# Patient Record
Sex: FEMALE | URBAN - METROPOLITAN AREA
[De-identification: names, ages, dates, MRNs, and addresses within clinical notes are randomized per-mention and may not be internally consistent; named-entity substitution may affect disease eponyms.]

---

## 2019-02-14 ENCOUNTER — INPATIENT (INPATIENT)
Facility: HOSPITAL | Age: 79
LOS: 6 days | Discharge: HOME | DRG: 205 | End: 2019-02-21
Attending: INTERNAL MEDICINE | Admitting: INTERNAL MEDICINE
Payer: MEDICARE

## 2019-02-14 VITALS
TEMPERATURE: 100 F | RESPIRATION RATE: 14 BRPM | OXYGEN SATURATION: 95 % | WEIGHT: 160.06 LBS | HEART RATE: 103 BPM | SYSTOLIC BLOOD PRESSURE: 108 MMHG | DIASTOLIC BLOOD PRESSURE: 73 MMHG | HEIGHT: 67 IN

## 2019-02-14 DIAGNOSIS — Z29.9 ENCOUNTER FOR PROPHYLACTIC MEASURES, UNSPECIFIED: ICD-10-CM

## 2019-02-14 DIAGNOSIS — N12 TUBULO-INTERSTITIAL NEPHRITIS, NOT SPECIFIED AS ACUTE OR CHRONIC: ICD-10-CM

## 2019-02-14 DIAGNOSIS — I10 ESSENTIAL (PRIMARY) HYPERTENSION: ICD-10-CM

## 2019-02-14 LAB
ALBUMIN SERPL ELPH-MCNC: 3.2 G/DL — LOW (ref 3.3–5)
ALP SERPL-CCNC: 64 U/L — SIGNIFICANT CHANGE UP (ref 40–120)
ALT FLD-CCNC: 90 U/L — HIGH (ref 12–78)
ANION GAP SERPL CALC-SCNC: 8 MMOL/L — SIGNIFICANT CHANGE UP (ref 5–17)
APPEARANCE UR: CLEAR — SIGNIFICANT CHANGE UP
APTT BLD: 33.5 SEC — SIGNIFICANT CHANGE UP (ref 28.5–37)
AST SERPL-CCNC: 129 U/L — HIGH (ref 15–37)
BASOPHILS # BLD AUTO: 0.02 K/UL — SIGNIFICANT CHANGE UP (ref 0–0.2)
BASOPHILS NFR BLD AUTO: 0.1 % — SIGNIFICANT CHANGE UP (ref 0–2)
BILIRUB SERPL-MCNC: 0.7 MG/DL — SIGNIFICANT CHANGE UP (ref 0.2–1.2)
BILIRUB UR-MCNC: NEGATIVE — SIGNIFICANT CHANGE UP
BUN SERPL-MCNC: 9 MG/DL — SIGNIFICANT CHANGE UP (ref 7–23)
CALCIUM SERPL-MCNC: 8.6 MG/DL — SIGNIFICANT CHANGE UP (ref 8.5–10.1)
CHLORIDE SERPL-SCNC: 104 MMOL/L — SIGNIFICANT CHANGE UP (ref 96–108)
CO2 SERPL-SCNC: 29 MMOL/L — SIGNIFICANT CHANGE UP (ref 22–31)
COLOR SPEC: YELLOW — SIGNIFICANT CHANGE UP
CREAT SERPL-MCNC: 0.73 MG/DL — SIGNIFICANT CHANGE UP (ref 0.5–1.3)
DIFF PNL FLD: ABNORMAL
EOSINOPHIL # BLD AUTO: 0.01 K/UL — SIGNIFICANT CHANGE UP (ref 0–0.5)
EOSINOPHIL NFR BLD AUTO: 0.1 % — SIGNIFICANT CHANGE UP (ref 0–6)
GLUCOSE SERPL-MCNC: 157 MG/DL — HIGH (ref 70–99)
GLUCOSE UR QL: NEGATIVE — SIGNIFICANT CHANGE UP
HCT VFR BLD CALC: 37.9 % — SIGNIFICANT CHANGE UP (ref 34.5–45)
HGB BLD-MCNC: 13.2 G/DL — SIGNIFICANT CHANGE UP (ref 11.5–15.5)
IMM GRANULOCYTES NFR BLD AUTO: 0.5 % — SIGNIFICANT CHANGE UP (ref 0–1.5)
INR BLD: 1.23 RATIO — HIGH (ref 0.88–1.16)
KETONES UR-MCNC: ABNORMAL
LACTATE SERPL-SCNC: 1.4 MMOL/L — SIGNIFICANT CHANGE UP (ref 0.7–2)
LEUKOCYTE ESTERASE UR-ACNC: ABNORMAL
LIDOCAIN IGE QN: 48 U/L — LOW (ref 73–393)
LYMPHOCYTES # BLD AUTO: 1.03 K/UL — SIGNIFICANT CHANGE UP (ref 1–3.3)
LYMPHOCYTES # BLD AUTO: 7.5 % — LOW (ref 13–44)
MAGNESIUM SERPL-MCNC: 1.8 MG/DL — SIGNIFICANT CHANGE UP (ref 1.6–2.6)
MCHC RBC-ENTMCNC: 29.1 PG — SIGNIFICANT CHANGE UP (ref 27–34)
MCHC RBC-ENTMCNC: 34.8 GM/DL — SIGNIFICANT CHANGE UP (ref 32–36)
MCV RBC AUTO: 83.5 FL — SIGNIFICANT CHANGE UP (ref 80–100)
MONOCYTES # BLD AUTO: 1.05 K/UL — HIGH (ref 0–0.9)
MONOCYTES NFR BLD AUTO: 7.6 % — SIGNIFICANT CHANGE UP (ref 2–14)
NEUTROPHILS # BLD AUTO: 11.6 K/UL — HIGH (ref 1.8–7.4)
NEUTROPHILS NFR BLD AUTO: 84.2 % — HIGH (ref 43–77)
NITRITE UR-MCNC: NEGATIVE — SIGNIFICANT CHANGE UP
NRBC # BLD: 0 /100 WBCS — SIGNIFICANT CHANGE UP (ref 0–0)
PH UR: 7 — SIGNIFICANT CHANGE UP (ref 5–8)
PHOSPHATE SERPL-MCNC: 3.3 MG/DL — SIGNIFICANT CHANGE UP (ref 2.5–4.5)
PLATELET # BLD AUTO: 253 K/UL — SIGNIFICANT CHANGE UP (ref 150–400)
POTASSIUM SERPL-MCNC: 3.6 MMOL/L — SIGNIFICANT CHANGE UP (ref 3.5–5.3)
POTASSIUM SERPL-SCNC: 3.6 MMOL/L — SIGNIFICANT CHANGE UP (ref 3.5–5.3)
PROT SERPL-MCNC: 8.3 G/DL — SIGNIFICANT CHANGE UP (ref 6–8.3)
PROT UR-MCNC: 75 MG/DL
PROTHROM AB SERPL-ACNC: 14.1 SEC — HIGH (ref 10–12.9)
RBC # BLD: 4.54 M/UL — SIGNIFICANT CHANGE UP (ref 3.8–5.2)
RBC # FLD: 14.3 % — SIGNIFICANT CHANGE UP (ref 10.3–14.5)
SODIUM SERPL-SCNC: 141 MMOL/L — SIGNIFICANT CHANGE UP (ref 135–145)
SP GR SPEC: 1 — LOW (ref 1.01–1.02)
TROPONIN I SERPL-MCNC: <.015 NG/ML — SIGNIFICANT CHANGE UP (ref 0.01–0.04)
UROBILINOGEN FLD QL: NEGATIVE — SIGNIFICANT CHANGE UP
WBC # BLD: 13.78 K/UL — HIGH (ref 3.8–10.5)
WBC # FLD AUTO: 13.78 K/UL — HIGH (ref 3.8–10.5)

## 2019-02-14 PROCEDURE — 71046 X-RAY EXAM CHEST 2 VIEWS: CPT | Mod: 26

## 2019-02-14 PROCEDURE — 74177 CT ABD & PELVIS W/CONTRAST: CPT | Mod: 26

## 2019-02-14 PROCEDURE — 93010 ELECTROCARDIOGRAM REPORT: CPT

## 2019-02-14 PROCEDURE — 99281 EMR DPT VST MAYX REQ PHY/QHP: CPT

## 2019-02-14 RX ORDER — ASPIRIN/CALCIUM CARB/MAGNESIUM 324 MG
1 TABLET ORAL
Qty: 0 | Refills: 0 | COMMUNITY

## 2019-02-14 RX ORDER — SODIUM CHLORIDE 9 MG/ML
999 INJECTION INTRAMUSCULAR; INTRAVENOUS; SUBCUTANEOUS ONCE
Qty: 0 | Refills: 0 | Status: COMPLETED | OUTPATIENT
Start: 2019-02-14 | End: 2019-02-14

## 2019-02-14 RX ORDER — ATENOLOL 25 MG/1
25 TABLET ORAL EVERY 12 HOURS
Qty: 0 | Refills: 0 | Status: DISCONTINUED | OUTPATIENT
Start: 2019-02-14 | End: 2019-02-21

## 2019-02-14 RX ORDER — ENOXAPARIN SODIUM 100 MG/ML
40 INJECTION SUBCUTANEOUS DAILY
Qty: 0 | Refills: 0 | Status: DISCONTINUED | OUTPATIENT
Start: 2019-02-14 | End: 2019-02-18

## 2019-02-14 RX ORDER — ACETAMINOPHEN 500 MG
650 TABLET ORAL EVERY 6 HOURS
Qty: 0 | Refills: 0 | Status: DISCONTINUED | OUTPATIENT
Start: 2019-02-14 | End: 2019-02-21

## 2019-02-14 RX ORDER — IOHEXOL 300 MG/ML
30 INJECTION, SOLUTION INTRAVENOUS ONCE
Qty: 0 | Refills: 0 | Status: COMPLETED | OUTPATIENT
Start: 2019-02-14 | End: 2019-02-14

## 2019-02-14 RX ORDER — MEROPENEM 1 G/30ML
1000 INJECTION INTRAVENOUS EVERY 8 HOURS
Qty: 0 | Refills: 0 | Status: DISCONTINUED | OUTPATIENT
Start: 2019-02-14 | End: 2019-02-16

## 2019-02-14 RX ORDER — ATENOLOL 25 MG/1
25 TABLET ORAL
Qty: 0 | Refills: 0 | COMMUNITY

## 2019-02-14 RX ORDER — SODIUM CHLORIDE 9 MG/ML
1000 INJECTION INTRAMUSCULAR; INTRAVENOUS; SUBCUTANEOUS
Qty: 0 | Refills: 0 | Status: DISCONTINUED | OUTPATIENT
Start: 2019-02-14 | End: 2019-02-15

## 2019-02-14 RX ORDER — ACETAMINOPHEN 500 MG
650 TABLET ORAL ONCE
Qty: 0 | Refills: 0 | Status: COMPLETED | OUTPATIENT
Start: 2019-02-14 | End: 2019-02-14

## 2019-02-14 RX ORDER — ASPIRIN/CALCIUM CARB/MAGNESIUM 324 MG
81 TABLET ORAL DAILY
Qty: 0 | Refills: 0 | Status: DISCONTINUED | OUTPATIENT
Start: 2019-02-14 | End: 2019-02-21

## 2019-02-14 RX ORDER — MEROPENEM 1 G/30ML
INJECTION INTRAVENOUS
Qty: 0 | Refills: 0 | Status: DISCONTINUED | OUTPATIENT
Start: 2019-02-14 | End: 2019-02-16

## 2019-02-14 RX ORDER — AMLODIPINE BESYLATE 2.5 MG/1
1 TABLET ORAL
Qty: 0 | Refills: 0 | COMMUNITY

## 2019-02-14 RX ORDER — MEROPENEM 1 G/30ML
1000 INJECTION INTRAVENOUS ONCE
Qty: 0 | Refills: 0 | Status: COMPLETED | OUTPATIENT
Start: 2019-02-14 | End: 2019-02-14

## 2019-02-14 RX ORDER — AMLODIPINE BESYLATE 2.5 MG/1
5 TABLET ORAL DAILY
Qty: 0 | Refills: 0 | Status: DISCONTINUED | OUTPATIENT
Start: 2019-02-15 | End: 2019-02-21

## 2019-02-14 RX ADMIN — MEROPENEM 100 MILLIGRAM(S): 1 INJECTION INTRAVENOUS at 21:07

## 2019-02-14 RX ADMIN — Medication 650 MILLIGRAM(S): at 23:49

## 2019-02-14 RX ADMIN — Medication 650 MILLIGRAM(S): at 09:02

## 2019-02-14 RX ADMIN — ATENOLOL 25 MILLIGRAM(S): 25 TABLET ORAL at 17:52

## 2019-02-14 RX ADMIN — Medication 81 MILLIGRAM(S): at 13:21

## 2019-02-14 RX ADMIN — SODIUM CHLORIDE 499.5 MILLILITER(S): 9 INJECTION INTRAMUSCULAR; INTRAVENOUS; SUBCUTANEOUS at 02:34

## 2019-02-14 RX ADMIN — ENOXAPARIN SODIUM 40 MILLIGRAM(S): 100 INJECTION SUBCUTANEOUS at 13:21

## 2019-02-14 RX ADMIN — SODIUM CHLORIDE 999 MILLILITER(S): 9 INJECTION INTRAMUSCULAR; INTRAVENOUS; SUBCUTANEOUS at 06:52

## 2019-02-14 RX ADMIN — Medication 650 MILLIGRAM(S): at 07:10

## 2019-02-14 RX ADMIN — SODIUM CHLORIDE 999 MILLILITER(S): 9 INJECTION INTRAMUSCULAR; INTRAVENOUS; SUBCUTANEOUS at 07:50

## 2019-02-14 RX ADMIN — MEROPENEM 100 MILLIGRAM(S): 1 INJECTION INTRAVENOUS at 15:37

## 2019-02-14 RX ADMIN — IOHEXOL 30 MILLILITER(S): 300 INJECTION, SOLUTION INTRAVENOUS at 02:34

## 2019-02-14 RX ADMIN — MEROPENEM 100 MILLIGRAM(S): 1 INJECTION INTRAVENOUS at 09:27

## 2019-02-14 RX ADMIN — SODIUM CHLORIDE 499.5 MILLILITER(S): 9 INJECTION INTRAMUSCULAR; INTRAVENOUS; SUBCUTANEOUS at 07:10

## 2019-02-14 NOTE — CONSULT NOTE ADULT - SUBJECTIVE AND OBJECTIVE BOX
CHIEF COMPLAINT: rt sided abdomnal pain    HISTORY OF PRESENT ILLNESS: 77 y/o woman with remote hx of stones had temp 101.5 at home 1 day PTA. She developed rt sided abdominal pain and came to ED. In ED she was found to have pyelonephritis.    PAST MEDICAL & SURGICAL HISTORY:  Hypertension  No significant past surgical history      REVIEW OF SYSTEMS:    CONSTITUTIONAL: No weakness,  +fevers andchills  EYES/ENT: No visual changes;  No vertigo or throat pain   NECK: No pain or stiffness  RESPIRATORY: No cough, wheezing, hemoptysis; No shortness of breath  CARDIOVASCULAR: No chest pain or palpitations  GASTROINTESTINAL: see hpi, no change BM  GENITOURINARY: No dysuria, frequency or hematuria  NEUROLOGICAL: No numbness or weakness  SKIN: No itching, burning, rashes, or lesions   All other review of systems is negative unless indicated above.    MEDICATIONS  (STANDING):  enoxaparin Injectable 40 milliGRAM(s) SubCutaneous daily  meropenem  IVPB      meropenem  IVPB 1000 milliGRAM(s) IV Intermittent every 8 hours  sodium chloride 0.9%. 1000 milliLiter(s) (80 mL/Hr) IV Continuous <Continuous>    MEDICATIONS  (PRN):  acetaminophen   Tablet .. 650 milliGRAM(s) Oral every 6 hours PRN Temp greater or equal to 38C (100.4F), Mild Pain (1 - 3)      Allergies    penicillin (Unknown)    Intolerances        SOCIAL HISTORY:    FAMILY HISTORY:      Vital Signs Last 24 Hrs  T(C): 37 (2019 09:02), Max: 38.2 (2019 06:51)  T(F): 98.6 (2019 09:02), Max: 100.8 (2019 06:51)  HR: 88 (2019 09:02) (87 - 104)  BP: 114/70 (2019 09:02) (108/73 - 115/73)  BP(mean): 87 (2019 06:51) (87 - 87)  RR: 14 (2019 09:02) (14 - 16)  SpO2: 96% (2019 09:02) (94% - 96%)    PHYSICAL EXAM:    Constitutional: NAD, well-developed  HEENT: FRANCESCO, EOMI, Normal Hearing, MMM  Neck: No LAD, No JVD  Back: Normal spine flexure, No CVA tenderness  Respiratory: not using accessory muscles   Cardiovascular: SRRR  Abd: BS+, soft, tender in ruq, and + rt cvat. No guarding or rebound  Extremities: No peripheral edema  Vascular: 2+ peripheral pulses  Neurological: A/O x 3, no focal deficits  Psychiatric: Normal mood, normal affect  Musculoskeletal: 5/5 strength b/l upper and lower extremities  Skin: No rashes    LABS:                        13.2   13.78 )-----------( 253      ( 2019 01:01 )             37.9     02-14    141  |  104  |  9   ----------------------------<  157<H>  3.6   |  29  |  0.73    Ca    8.6      2019 01:01  Phos  3.3     02-14  Mg     1.8     -    TPro  8.3  /  Alb  3.2<L>  /  TBili  0.7  /  DBili  x   /  AST  129<H>  /  ALT  90<H>  /  AlkPhos  64  02-14    PT/INR - ( 2019 01:01 )   PT: 14.1 sec;   INR: 1.23 ratio         PTT - ( 2019 01:01 )  PTT:33.5 sec  Urinalysis Basic - ( 2019 01:18 )    Color: Yellow / Appearance: Clear / S.005 / pH: x  Gluc: x / Ketone: Moderate  / Bili: Negative / Urobili: Negative   Blood: x / Protein: 75 mg/dL / Nitrite: Negative   Leuk Esterase: Small / RBC: 6-10 /HPF / WBC 3-5   Sq Epi: x / Non Sq Epi: Occasional / Bacteria: Few      Urine Culture:  pending  RADIOLOGY & ADDITIONAL STUDIES:  EXAM:  CT ABDOMEN AND PELVIS OC IC                            PROCEDURE DATE:  2019          INTERPRETATION:  CT ABDOMEN AND PELVIS WITH CONTRAST    INDICATION: Right lower quadrant tenderness.    TECHNIQUE: Contrast enhanced CT of the abdomen and pelvis. Images are   reformatted in the sagittal and coronal planes.    80 mL of Omnipaque 350 contrast material was injected IV. Oral contrast   was also administered.    COMPARISON: None.    FINDINGS:    Lower Thorax: No consolidation or effusion. Bronchiectasis with too small   to characterize nodular densities within bilateral lower lobes. Branching   tubular opacity in the right lower lobe. Recommend clinical correlation   to assess for superimposed developing infectious or inflammatoryprocess.   Short-term pulmonary imaging follow-up is advised to demonstrate   resolution as neoplastic etiologies considered in the differential.   Elevated right hemidiaphragm.    Liver: Too small to characterize right posterior hepatic lobe hypodensity.  Biliary: No dilatation. No calcified gallstones within the gallbladder.  Spleen: No suspicious lesions.      Pancreas: No inflammatory changes or ductal dilatation.      Adrenals: Normal.      Kidneys: Heterogeneously enhancing edematous right kidney with focal   low-attenuation opacity in the upper pole posteriorly. Slight prominence   of the right renal pelvis with peripheral wall enhancement. Findings are   likely in keeping with ascending tract infection or pyelonephritis.   Possibilityof acute focal bacterial nephritis or developing intrarenal   abscess considered. There is mild right perinephric stranding/trace   fluid. Incompletely characterized left inferior pole renal hypodensity   measuring up to 1.6 cm. Additional too small to characterize bilateral   renal hypodensities. Consider nonemergent correlation with renal   ultrasound for better characterization. There are scattered punctate   nonobstructive bilateral renal calculi measuring 3 to 4 mm in either   kidney, more pronounced on the right side.  Vessels: Normal caliber. Atherosclerotic disease of the aorta and its   branches.    GI tract: No evidence of small bowel obstruction. No wall thickening or   inflammatory changes. Appendix is not visualized without secondary signs   of acute appendicitis.    Peritoneum/retroperitoneum and mesentery: No free air. No organized fluid   collection. No adenopathy.        Pelvic organs/Bladder: No pelvic masses. Bladder is normal.        Abdominal wall: Small lipoma in theleft anterior thigh muscles.  Bones and soft tissues: Multilevel degenerative changes of the spine are   noted with disc degeneration at L2-L3 and L3-L4 containing vacuum   phenomena.    IMPRESSION:    Heterogeneously enhancing edematous right kidneywith focal   low-attenuation opacity in the upper pole posteriorly. Slight prominence   of the right renal pelvis with peripheral wall enhancement. Findings are   likely in keeping with ascending tract infection or pyelonephritis.   Possibility of acute focal bacterial nephritis or developing intrarenal   abscess considered. Mild right perinephric stranding/trace fluid.   Scattered punctate nonobstructive bilateral renal calculi, more   pronounced on the right side.    Incompletely characterized left inferior pole renal hypodensity measuring   up to 1.6 cm. Additional too small to characterize bilateral renal   hypodensities. Consider nonemergent correlation with renal ultrasound for   better characterization.     Appendix is not visualized without secondary signs of acute appendicitis.    Additional findings as mentioned above.    These results were discussed via telephone at 2019 5:17 AM by Dr. Gonsalez of radiology with Dr. Murillo, institution read-back verification   policy was followed.                 KASSIE GONSALEZ M.D., ATTENDING RADIOLOGIST  This document has been electronically signed. 2019  5:33AM

## 2019-02-14 NOTE — ED PROVIDER NOTE - OBJECTIVE STATEMENT
78-year-old female brought in by son history obtained by translation.  And thenDeveloped if right-sided abdominal pain patient was seen in an urgent care evaluatedAdvised to come to emergency room. Patient states she has had chills to current intermittentGradualNo aggravating factors no relieving factors patientWith poor by mouth intake today no recent travel no recent antibiotics. No complaints of dysuria no complains of flank pain no previous similarComplaints.Pain on the right side is achy deeper than palpation no previous history of similar complaints. Did not take any pain medication.

## 2019-02-14 NOTE — ED ADULT NURSE NOTE - CHPI ED NUR SYMPTOMS NEG
no dysuria/no hematuria/no nausea/no burning urination/no fever/no abdominal distension/no blood in stool/no chills/no diarrhea/no vomiting

## 2019-02-14 NOTE — ED ADULT NURSE NOTE - OBJECTIVE STATEMENT
Pt sent from urgent care for RLQ abdominal pain. Pt reports abdominal pain, denies N/V starting today. Pt breathing easy, unlabored, no signs of distress. Pt was tested for the flu at urgent care which was negative per pt.

## 2019-02-14 NOTE — ED PROVIDER NOTE - CLINICAL SUMMARY MEDICAL DECISION MAKING FREE TEXT BOX
78-year-old femaleOtherwise in good state of health presents to emergency room with complaints ofIntubated chills,Right-sided abdominal pain, weakness and near syncope seen in urgent care earlier.Plan to check urine CT of abdomen and pelvis labs give fluidsRectal temp and reevaluate.

## 2019-02-14 NOTE — ED PROVIDER NOTE - NS ED ROS FT
no weight change, no fever or chills  gen weakness  no recent travel, no recent abox, no sick contacts  no recent change in medications  no rash, no bruises  no visual changes no eye discharge  no cough cold or congestion,   no sob, no chest pain  no orthopnea, no pnd  has abd pain, no n/v/d  no hematuria, no change in urinary habits  no joint pain, no deformity  no headache, no paresthesia

## 2019-02-14 NOTE — CONSULT NOTE ADULT - PROBLEM SELECTOR RECOMMENDATION 9
Pt has been diagnosed with rt pyelonephritis and staerted on broad spectrum abiots. Await cultures. No further intervention is necessary now, will follow clinical course with you.

## 2019-02-14 NOTE — H&P ADULT - PROBLEM SELECTOR PLAN 1
Admit  Pan-culture  Meropenem 1g q8h  ID/ consult  Further work-up/management pending clinical course.

## 2019-02-14 NOTE — ED ADULT NURSE REASSESSMENT NOTE - NS ED NURSE REASSESS COMMENT FT1
pt aox4, [place], " abd pain not feeling well since last night" no n/v, no sob, lungs clear, abd soft, + sounds, IVF infusing well

## 2019-02-14 NOTE — ED PROVIDER NOTE - PHYSICAL EXAMINATION
Constitutional : Appears comfortably, talking in full sentences  Head :NC AT , no swelling  Eyes :eomi, no swelling  Mouth :mm dry  Neck : supple, trachea in midline  Chest :[place] air entry, symm chest expansion, no distress  Heart :S1 S2 distant  Abdomen :abd soft, right lower  tender  scaphoid  Musc/Skel :ext no swelling, no deformity,   no spine tenderness, distal pulses present  Neuro  :AAO 3 no focal deficits

## 2019-02-14 NOTE — CONSULT NOTE ADULT - SUBJECTIVE AND OBJECTIVE BOX
Infectious Diseases Consult by Kayley Blanco MD    Reason for Consult :    HPI:  77 y/o woman with remote hx of kidney stones had severe shaking chills followed by  temp 101.5 at home 1 day PTA. She developed rt sided abdominal and flank  pain and came to ED. In ED she had CT abd and pelvis done and  was found to have right sided pyelonephritis with possible right renal abscess, so admitted for treatment and further work up .     She has hx of recurrent UTI, last one was 1 month ago . No hx of DM . No nausea or vomiting .    She has hx of allergy to PCN, in ER got one dose of meropenem , had no reaction so far         Past Medical & Surgical Hx:  PAST MEDICAL & SURGICAL HISTORY:  Hypertension  No significant past surgical history      Social History-- Retired lives with family   EtOH: denies   Tobacco: denies   Drug Use: denies     FAMILY HISTORY:      Allergies    penicillin (Unknown)    Intolerances        Home/ Out patient  Medications :  Home Medications:  amLODIPine 5 mg oral tablet: 1 tab(s) orally once a day (2019 07:19)  atenolol 50 mg oral tablet: 25 milligram(s) orally 2 times a day (2019 07:19)      Current Inpatient Medications :    ANTIBIOTICS:   meropenem  IVPB      meropenem  IVPB 1000 milliGRAM(s) IV Intermittent every 8 hours      OTHER RELEVANT MEDICATIONS :  acetaminophen   Tablet .. 650 milliGRAM(s) Oral every 6 hours PRN  enoxaparin Injectable 40 milliGRAM(s) SubCutaneous daily  sodium chloride 0.9%. 1000 milliLiter(s) IV Continuous <Continuous>      ROS:  Unable to obtain due to :     ROS:  CONSTITUTIONAL:  Positive for fever and  chills, feels weak, poor  appetite  EYES:  Negative  blurry vision or double vision  CARDIOVASCULAR:  Negative for chest pain or palpitations  RESPIRATORY:  Negative for cough, wheezing, or SOB   GASTROINTESTINAL:  Negative for nausea, vomiting, diarrhea, constipation, or abdominal pain  GENITOURINARY:  Positive  frequency, urgency , dysuria or hematuria   NEUROLOGIC:  No headache, confusion, dizziness, lightheadedness  All other systems were reviewed and are negative          I&O's Detail      Physical Exam:  Vital Signs Last 24 Hrs  T(C): 37 (2019 09:02), Max: 38.2 (2019 06:51)  T(F): 98.6 (2019 09:02), Max: 100.8 (2019 06:51)  HR: 88 (2019 09:02) (87 - 104)  BP: 114/70 (2019 09:02) (108/73 - 115/73)  BP(mean): 87 (2019 06:51) (87 - 87)  RR: 14 (2019 09:02) (14 - 16)  SpO2: 96% (2019 09:02) (94% - 96%)  Height (cm): 170.18 ( @ 00:24)  Weight (kg): 72.6 ( @ 00:24)  BMI (kg/m2): 25.1 ( @ 00:24)  BSA (m2): 1.84 ( @ 00:24)    General: well developed well nourished, in no acute distress  Eyes: sclera anicteric, pupils equal and reactive to light  ENMT: buccal mucosa moist, pharynx not injected  Neck: supple, trachea midline  Lungs: clear, no wheeze/rhonchi  Cardiovascular: regular rate and rhythm, S1 S2  Abdomen: soft, nontender, no organomegaly present, bowel sounds normal no CVA or supra pubic tenderness   Neurological:  alert and oriented x3, Cranial Nerves II-XII grossly intact  Skin:no increased ecchymosis/petechiae/purpura  Lymph Nodes: no palpable cervical/supraclavicular lymph nodes enlargements  Extremities: no cyanosis/clubbing/edema    Labs:                             13.2   13.78  )----------(  253       ( 2019 01:01 )               37.9      141    |  104    |  9      ----------------------------<  157        ( 2019 01:01 )  3.6     |  29     |  0.73     Ca    8.6        ( 2019 01:01 )  Phos  3.3       ( :01 )  Mg     1.8       ( :01 )    TPro  8.3    /  Alb  3.2    /  TBili  0.7    /  DBili  x      /  AST  129    /  ALT  90     /  AlkPhos  64     ( 2019 01:01 )    LIVER FUNCTIONS - ( 2019 01:01 )  Alb: 3.2 g/dL / Pro: 8.3 g/dL / ALK PHOS: 64 U/L / ALT: 90 U/L / AST: 129 U/L / GGT: x           PT/INR -  14.1 sec / 1.23 ratio   ( 2019 01:01 )       PTT -  33.5 sec   ( 2019 01:01 )  CAPILLARY BLOOD GLUCOSE    CARDIAC MARKERS ( :01 )  <.015 ng/mL / x     / x     / x     / x          Urinalysis Basic - ( 2019 01:18 )    Color: Yellow / Appearance: Clear / S.005 / pH: x  Gluc: x / Ketone: Moderate  / Bili: Negative / Urobili: Negative   Blood: x / Protein: 75 mg/dL / Nitrite: Negative   Leuk Esterase: Small / RBC: 6-10 /HPF / WBC 3-5   Sq Epi: x / Non Sq Epi: Occasional / Bacteria: Few    Lactate, Blood: 1.4 mmol/L (19 @ 01:01)          RECENT CULTURES:      RADIOLOGY & ADDITIONAL STUDIES:  CT Abdomen and Pelvis w/ Oral Cont and w/ IV Cont (19 @ 04:58) >  FINDINGS:    Lower Thorax: No consolidation or effusion. Bronchiectasis with too small   to characterize nodular densities within bilateral lower lobes. Branching   tubular opacity in the right lower lobe. Recommend clinical correlation   to assess for superimposed developing infectious or inflammatoryprocess.   Short-term pulmonary imaging follow-up is advised to demonstrate   resolution as neoplastic etiologies considered in the differential.   Elevated right hemidiaphragm.    Liver: Too small to characterize right posterior hepatic lobe hypodensity.  Biliary: No dilatation. No calcified gallstones within the gallbladder.  Spleen: No suspicious lesions.      Pancreas: No inflammatory changes or ductal dilatation.      Adrenals: Normal.      Kidneys: Heterogeneously enhancing edematous right kidney with focal   low-attenuation opacity in the upper pole posteriorly. Slight prominence   of the right renal pelvis with peripheral wall enhancement. Findings are   likely in keeping with ascending tract infection or pyelonephritis.   Possibilityof acute focal bacterial nephritis or developing intrarenal   abscess considered. There is mild right perinephric stranding/trace   fluid. Incompletely characterized left inferior pole renal hypodensity   measuring up to 1.6 cm. Additional too small to characterize bilateral   renal hypodensities. Consider nonemergent correlation with renal   ultrasound for better characterization. There are scattered punctate   nonobstructive bilateral renal calculi measuring 3 to 4 mm in either   kidney, more pronounced on the right side.  Vessels: Normal caliber. Atherosclerotic disease of the aorta and its   branches.    GI tract: No evidence of small bowel obstruction. No wall thickening or   inflammatory changes. Appendix is not visualized without secondary signs   of acute appendicitis.    Peritoneum/retroperitoneum and mesentery: No free air. No organized fluid   collection. No adenopathy.        Pelvic organs/Bladder: No pelvic masses. Bladder is normal.        Abdominal wall: Small lipoma in theleft anterior thigh muscles.  Bones and soft tissues: Multilevel degenerative changes of the spine are   noted with disc degeneration at L2-L3 and L3-L4 containing vacuum   phenomena.    IMPRESSION:    Heterogeneously enhancing edematous right kidney with focal   low-attenuation opacity in the upper pole posteriorly. Slight prominence   of the right renal pelvis with peripheral wall enhancement. Findings are   likely in keeping with ascending tract infection or pyelonephritis.   Possibility of acute focal bacterial nephritis or developing intrarenal   abscess considered. Mild right perinephric stranding/trace fluid.   Scattered punctate nonobstructive bilateral renal calculi, more   pronounced on the right side.    Incompletely characterized left inferior pole renal hypodensity measuring   up to 1.6 cm. Additional too small to characterize bilateral renal   hypo densities. Consider nonemergent correlation with renal ultrasound for   better characterization.     Appendix is not visualized without secondary signs of acute appendicitis.    Additional findings as mentioned above.      Assessment :     77 y/o woman hx of HTN and with remote hx of kidney stones admitted with 1 day hx of severe chills at home with fever and right sided flank pain , found to have acute right sided pyelonephritis with possibility of developing abscess although clinically appears non toxic and has no significant  flank tenderness  . Based on her presentation she may be bacteremic     Doubt her allergy to PCN, she has so far tolerated Meropenem well   Plan :   - continue with IV Meropenem 1 gram q 8 hours pending cs results   - trend cbc / bmp   - follow cs results   - urology evaluation noted     Continue with present regime .  Appropriate use of antibiotics and adverse effects reviewed.      I have discussed the above plan of care with patient and her Son present at bedside  in detail. They expressed understanding of the treatment plan . Risks, benefits and alternatives discussed in detail. I have asked if they have any questions or concerns and appropriately addressed them to the best of my ability . She has no advanced directives on chart       > 65  minutes spent in direct patient care reviewing  the notes, lab data/ imaging , discussion with multidisciplinary team. All questions were addressed and answered to the best of my capacity .    Thank you for allowing me to participate in the care of your patient .      Kayley Blanco MD  [phone]

## 2019-02-14 NOTE — H&P ADULT - HISTORY OF PRESENT ILLNESS
79 y/o woman with PMH of HTN and cardiac arrhythmia (s/p cardioversion in Summer 2018) and remote hx of kidney stones had severe shaking chills followed by  temp 101.5 at home 1 day PTA. She developed rt sided abdominal and flank  pain and came to ED. In ED she had CT abd and pelvis done and  was found to have right sided pyelonephritis with possible right renal abscess, so admitted for treatment and further work up .     She has hx of recurrent UTI, last one was 1 month ago . No hx of DM . No nausea or vomiting .

## 2019-02-15 LAB
ANION GAP SERPL CALC-SCNC: 8 MMOL/L — SIGNIFICANT CHANGE UP (ref 5–17)
BASOPHILS # BLD AUTO: 0.02 K/UL — SIGNIFICANT CHANGE UP (ref 0–0.2)
BASOPHILS NFR BLD AUTO: 0.2 % — SIGNIFICANT CHANGE UP (ref 0–2)
BUN SERPL-MCNC: 8 MG/DL — SIGNIFICANT CHANGE UP (ref 7–23)
CALCIUM SERPL-MCNC: 7.7 MG/DL — LOW (ref 8.5–10.1)
CHLORIDE SERPL-SCNC: 110 MMOL/L — HIGH (ref 96–108)
CO2 SERPL-SCNC: 28 MMOL/L — SIGNIFICANT CHANGE UP (ref 22–31)
CREAT SERPL-MCNC: 0.54 MG/DL — SIGNIFICANT CHANGE UP (ref 0.5–1.3)
CULTURE RESULTS: SIGNIFICANT CHANGE UP
EOSINOPHIL # BLD AUTO: 0 K/UL — SIGNIFICANT CHANGE UP (ref 0–0.5)
EOSINOPHIL NFR BLD AUTO: 0 % — SIGNIFICANT CHANGE UP (ref 0–6)
FLU A RESULT: SIGNIFICANT CHANGE UP
FLU A RESULT: SIGNIFICANT CHANGE UP
FLUAV AG NPH QL: SIGNIFICANT CHANGE UP
FLUBV AG NPH QL: SIGNIFICANT CHANGE UP
GLUCOSE SERPL-MCNC: 104 MG/DL — HIGH (ref 70–99)
HCT VFR BLD CALC: 32.5 % — LOW (ref 34.5–45)
HGB BLD-MCNC: 11.1 G/DL — LOW (ref 11.5–15.5)
IMM GRANULOCYTES NFR BLD AUTO: 0.5 % — SIGNIFICANT CHANGE UP (ref 0–1.5)
LYMPHOCYTES # BLD AUTO: 1.43 K/UL — SIGNIFICANT CHANGE UP (ref 1–3.3)
LYMPHOCYTES # BLD AUTO: 13.3 % — SIGNIFICANT CHANGE UP (ref 13–44)
MAGNESIUM SERPL-MCNC: 1.9 MG/DL — SIGNIFICANT CHANGE UP (ref 1.6–2.6)
MCHC RBC-ENTMCNC: 28.9 PG — SIGNIFICANT CHANGE UP (ref 27–34)
MCHC RBC-ENTMCNC: 34.2 GM/DL — SIGNIFICANT CHANGE UP (ref 32–36)
MCV RBC AUTO: 84.6 FL — SIGNIFICANT CHANGE UP (ref 80–100)
MONOCYTES # BLD AUTO: 0.89 K/UL — SIGNIFICANT CHANGE UP (ref 0–0.9)
MONOCYTES NFR BLD AUTO: 8.3 % — SIGNIFICANT CHANGE UP (ref 2–14)
NEUTROPHILS # BLD AUTO: 8.33 K/UL — HIGH (ref 1.8–7.4)
NEUTROPHILS NFR BLD AUTO: 77.7 % — HIGH (ref 43–77)
NRBC # BLD: 0 /100 WBCS — SIGNIFICANT CHANGE UP (ref 0–0)
PLATELET # BLD AUTO: 196 K/UL — SIGNIFICANT CHANGE UP (ref 150–400)
POTASSIUM SERPL-MCNC: 3.3 MMOL/L — LOW (ref 3.5–5.3)
POTASSIUM SERPL-SCNC: 3.3 MMOL/L — LOW (ref 3.5–5.3)
PROCALCITONIN SERPL-MCNC: 0.6 NG/ML — HIGH (ref 0–0.04)
RBC # BLD: 3.84 M/UL — SIGNIFICANT CHANGE UP (ref 3.8–5.2)
RBC # FLD: 14.3 % — SIGNIFICANT CHANGE UP (ref 10.3–14.5)
RSV RESULT: SIGNIFICANT CHANGE UP
RSV RNA RESP QL NAA+PROBE: SIGNIFICANT CHANGE UP
SODIUM SERPL-SCNC: 146 MMOL/L — HIGH (ref 135–145)
SPECIMEN SOURCE: SIGNIFICANT CHANGE UP
WBC # BLD: 10.72 K/UL — HIGH (ref 3.8–10.5)
WBC # FLD AUTO: 10.72 K/UL — HIGH (ref 3.8–10.5)

## 2019-02-15 RX ADMIN — MEROPENEM 100 MILLIGRAM(S): 1 INJECTION INTRAVENOUS at 05:15

## 2019-02-15 RX ADMIN — SODIUM CHLORIDE 80 MILLILITER(S): 9 INJECTION INTRAMUSCULAR; INTRAVENOUS; SUBCUTANEOUS at 05:18

## 2019-02-15 RX ADMIN — Medication 650 MILLIGRAM(S): at 00:47

## 2019-02-15 RX ADMIN — Medication 81 MILLIGRAM(S): at 11:58

## 2019-02-15 RX ADMIN — MEROPENEM 100 MILLIGRAM(S): 1 INJECTION INTRAVENOUS at 21:27

## 2019-02-15 RX ADMIN — ENOXAPARIN SODIUM 40 MILLIGRAM(S): 100 INJECTION SUBCUTANEOUS at 11:58

## 2019-02-15 RX ADMIN — ATENOLOL 25 MILLIGRAM(S): 25 TABLET ORAL at 17:06

## 2019-02-15 RX ADMIN — ATENOLOL 25 MILLIGRAM(S): 25 TABLET ORAL at 05:15

## 2019-02-15 RX ADMIN — MEROPENEM 100 MILLIGRAM(S): 1 INJECTION INTRAVENOUS at 13:54

## 2019-02-15 NOTE — PROGRESS NOTE ADULT - SUBJECTIVE AND OBJECTIVE BOX
INTERVAL HPI/OVERNIGHT EVENTS: feels miserable. Having pain, fevers    MEDICATIONS  (STANDING):  amLODIPine   Tablet 5 milliGRAM(s) Oral daily  aspirin enteric coated 81 milliGRAM(s) Oral daily  ATENolol  Tablet 25 milliGRAM(s) Oral every 12 hours  enoxaparin Injectable 40 milliGRAM(s) SubCutaneous daily  meropenem  IVPB      meropenem  IVPB 1000 milliGRAM(s) IV Intermittent every 8 hours  sodium chloride 0.9%. 1000 milliLiter(s) (80 mL/Hr) IV Continuous <Continuous>    MEDICATIONS  (PRN):  acetaminophen   Tablet .. 650 milliGRAM(s) Oral every 6 hours PRN Temp greater or equal to 38C (100.4F), Mild Pain (1 - 3)        Vital Signs Last 24 Hrs  T(C): 36.9 (15 Feb 2019 04:56), Max: 39.2 (2019 23:59)  T(F): 98.4 (15 Feb 2019 04:56), Max: 102.6 (2019 23:59)  HR: 93 (15 Feb 2019 04:56) (87 - 110)  BP: 114/73 (15 Feb 2019 04:56) (99/62 - 116/69)  BP(mean): 87 (2019 06:51) (87 - 87)  RR: 16 (15 Feb 2019 04:56) (14 - 18)  SpO2: 95% (15 Feb 2019 04:56) (94% - 98%)    PHYSICAL EXAM:    ABDOMEN: + LUQ tenderness, no rbd, no guarding, + rt CVat    LABS:                        13.2   13.78 )-----------( 253      ( 2019 01:01 )             37.9     02-14    141  |  104  |  9   ----------------------------<  157<H>  3.6   |  29  |  0.73    Ca    8.6      2019 01:01  Phos  3.3     02-14  Mg     1.8     02-14    TPro  8.3  /  Alb  3.2<L>  /  TBili  0.7  /  DBili  x   /  AST  129<H>  /  ALT  90<H>  /  AlkPhos  64  02-14    PT/INR - ( 2019 01:01 )   PT: 14.1 sec;   INR: 1.23 ratio         PTT - ( 2019 01:01 )  PTT:33.5 sec  Urinalysis Basic - ( 2019 01:18 )    Color: Yellow / Appearance: Clear / S.005 / pH: x  Gluc: x / Ketone: Moderate  / Bili: Negative / Urobili: Negative   Blood: x / Protein: 75 mg/dL / Nitrite: Negative   Leuk Esterase: Small / RBC: 6-10 /HPF / WBC 3-5   Sq Epi: x / Non Sq Epi: Occasional / Bacteria: Few          RADIOLOGY & ADDITIONAL TESTS:

## 2019-02-15 NOTE — PROGRESS NOTE ADULT - SUBJECTIVE AND OBJECTIVE BOX
Patient is a 78y old  Female who presents with a chief complaint of fever, abdominal pain (15 Feb 2019 14:33)      INTERVAL HPI/OVERNIGHT EVENTS: Patient seen and examined. NAD. No complaints.    Vital Signs Last 24 Hrs  T(C): 37.5 (15 Feb 2019 15:48), Max: 39.2 (2019 23:59)  T(F): 99.5 (15 Feb 2019 15:48), Max: 102.6 (2019 23:59)  HR: 96 (15 Feb 2019 15:48) (93 - 110)  BP: 124/73 (15 Feb 2019 15:48) (102/68 - 124/73)  BP(mean): --  RR: 17 (15 Feb 2019 15:48) (16 - 18)  SpO2: 98% (15 Feb 2019 15:48) (94% - 98%)    02-15    146<H>  |  110<H>  |  8   ----------------------------<  104<H>  3.3<L>   |  28  |  0.54    Ca    7.7<L>      15 Feb 2019 06:34  Phos  3.3     02-14  Mg     1.9     02-15    TPro  8.3  /  Alb  3.2<L>  /  TBili  0.7  /  DBili  x   /  AST  129<H>  /  ALT  90<H>  /  AlkPhos  64  02-14                          11.1   10.72 )-----------( 196      ( 15 Feb 2019 06:34 )             32.5     PT/INR - ( 2019 01:01 )   PT: 14.1 sec;   INR: 1.23 ratio         PTT - ( 2019 01:01 )  PTT:33.5 sec  CAPILLARY BLOOD GLUCOSE        Urinalysis Basic - ( 2019 01:18 )    Color: Yellow / Appearance: Clear / S.005 / pH: x  Gluc: x / Ketone: Moderate  / Bili: Negative / Urobili: Negative   Blood: x / Protein: 75 mg/dL / Nitrite: Negative   Leuk Esterase: Small / RBC: 6-10 /HPF / WBC 3-5   Sq Epi: x / Non Sq Epi: Occasional / Bacteria: Few              acetaminophen   Tablet .. 650 milliGRAM(s) Oral every 6 hours PRN  amLODIPine   Tablet 5 milliGRAM(s) Oral daily  aspirin enteric coated 81 milliGRAM(s) Oral daily  ATENolol  Tablet 25 milliGRAM(s) Oral every 12 hours  enoxaparin Injectable 40 milliGRAM(s) SubCutaneous daily  meropenem  IVPB      meropenem  IVPB 1000 milliGRAM(s) IV Intermittent every 8 hours              REVIEW OF SYSTEMS:  CONSTITUTIONAL: + fever, no weight loss, or no fatigue  NECK: No pain, no stiffness  RESPIRATORY: No cough, no wheezing, no chills, no hemoptysis, No shortness of breath  CARDIOVASCULAR: No chest pain, no palpitations, no dizziness, no leg swelling  GASTROINTESTINAL: No abdominal pain. No nausea, no vomiting, no hematemesis; No diarrhea, no constipation. No melena, no hematochezia.  GENITOURINARY: No dysuria, no frequency, no hematuria, no incontinence  NEUROLOGICAL: No headaches, no loss of strength, no numbness, no tremors  SKIN: No itching, no burning  MUSCULOSKELETAL: No joint pain, no swelling; No muscle, no back, no extremity pain  PSYCHIATRIC: No depression, no mood swings,   HEME/LYMPH: No easy bruising, no bleeding gums  ALLERY AND IMMUNOLOGIC: No hives       Consultant(s) Notes Reviewed:  [X] YES  [ ] NO    PHYSICAL EXAM:  GENERAL: NAD  HEAD:  Atraumatic, Normocephalic  EYES: EOMI, PERRLA, conjunctiva and sclera clear  ENMT: No tonsillar erythema, exudates, or enlargement; Moist mucous membranes  NECK: Supple, No JVD  NERVOUS SYSTEM:  Awake & alert  CHEST/LUNG: Clear to auscultation bilaterally; No rales, rhonchi, wheezing,  HEART: Regular rate and rhythm  ABDOMEN: Soft, Nontender, Nondistended; Bowel sounds present  EXTREMITIES:  No clubbing, cyanosis, or edema  LYMPH: No lymphadenopathy noted  SKIN: No rashes      Advanced care planning discussed with patient/family [X] YES   [ ] NO    Advanced care planning discussed with patient/family. Advanced care planning forms reviewed/discussed/completed. 20 minutes spent.

## 2019-02-15 NOTE — PROGRESS NOTE ADULT - SUBJECTIVE AND OBJECTIVE BOX
ID progress note    Name: JONATHAN HERNANDEZ  Age: 78y  Gender: Female  MRN: [id number]    Interval History-- Events noted, feels better wants to go home, Tmax 102 last night . No flank pain, has non productive cough. No nausea or vomiting . Sepsis work up negative so far   Notes reviewed    Past Medical History--  Hypertension  No significant past surgical history      For details regarding the patient's social history, family history, and other miscellaneous elements, please refer the initial infectious diseases consultation and/or the admitting history and physical examination for this admission.    Allergies--  Allergies    penicillin (Unknown)    Intolerances        Medications--  Antibiotics:  meropenem  IVPB      meropenem  IVPB 1000 milliGRAM(s) IV Intermittent every 8 hours    Immunologic:    Other:  acetaminophen   Tablet .. PRN  amLODIPine   Tablet  aspirin enteric coated  ATENolol  Tablet  enoxaparin Injectable      Review of Systems--  A 10-point review of systems was obtained.     Pertinent positives and negatives--  Constitutional: No fevers. No Chills. No Rigors.   Cardiovascular: No chest pain. No palpitations.  Respiratory: No shortness of breath. No cough.  Gastrointestinal: No nausea or vomiting. No diarrhea or constipation.   Psychiatric: Pleasant. Appropriate affect.    Review of systems otherwise negative except as previously noted.    Physical Examination--  Vital Signs: T(F): 98.1 (02-15-19 @ 07:43), Max: 102.6 (02-14-19 @ 23:59)  HR: 93 (02-15-19 @ 07:43)  BP: 108/72 (02-15-19 @ 07:43)  RR: 16 (02-15-19 @ 07:43)  SpO2: 95% (02-15-19 @ 07:43)  Wt(kg): --  General: Nontoxic-appearing Female in no acute distress.  HEENT: AT/NC. PERRL. EOMI. Anicteric. Conjunctiva pink and moist. Oropharynx clear. Dentition fair.  Neck: Not rigid. No sense of mass.  Nodes: None palpable.  Lungs: Clear bilaterally without rales, wheezing or rhonchi  Heart: Regular rate and rhythm. No Murmur. No rub. No gallop. No palpable thrill.  Abdomen: Bowel sounds present and normoactive. Soft. Nondistended. Nontender. No sense of mass. No organomegaly.  Back: No spinal tenderness. No costovertebral angle tenderness.   Extremities: No cyanosis or clubbing. No edema.   Skin: Warm. Dry. Good turgor. No rash. No vasculitic stigmata.  Psychiatric: Appropriate affect and mood for situation.         Laboratory Studies--  CBC                        11.1   10.72 )-----------( 196      ( 15 Feb 2019 06:34 )             32.5       Chemistries  02-15    146<H>  |  110<H>  |  8   ----------------------------<  104<H>  3.3<L>   |  28  |  0.54    Ca    7.7<L>      15 Feb 2019 06:34  Phos  3.3     02-14  Mg     1.9     02-15    TPro  8.3  /  Alb  3.2<L>  /  TBili  0.7  /  DBili  x   /  AST  129<H>  /  ALT  90<H>  /  AlkPhos  64  02-14      Culture Data    Culture - Urine (collected 14 Feb 2019 11:07)  Source: .Urine Clean Catch (Midstream)  Final Report (15 Feb 2019 07:53):    <10,000 CFU/ml Normal Urogenital jessee present    Culture - Blood (collected 14 Feb 2019 09:23)  Source: .Blood Blood-Peripheral  Preliminary Report (15 Feb 2019 10:00):    No growth to date.    Culture - Blood (collected 14 Feb 2019 09:23)  Source: .Blood Blood-Peripheral  Preliminary Report (15 Feb 2019 10:00):    No growth to date.      RADIOLOGY:  CT Abdomen and Pelvis w/ Oral Cont and w/ IV Cont (02.14.19 @ 04:58) >  IMPRESSION:    Heterogeneously enhancing edematous right kidney with focal   low-attenuation opacity in the upper pole posteriorly. Slight prominence   of the right renal pelvis with peripheral wall enhancement. Findings are   likely in keeping with ascending tract infection or pyelonephritis.   Possibility of acute focal bacterial nephritis or developing intrarenal   abscess considered. Mild right perinephric stranding/trace fluid.   Scattered punctate nonobstructive bilateral renal calculi, more   pronounced on the right side.    Incompletely characterized left inferior pole renal hypodensity measuring   up to 1.6 cm. Additional too small to characterize bilateral renal   hypo densities. Consider nonemergent correlation with renal ultrasound for   better characterization.     Appendix is not visualized without secondary signs of acute appendicitis.    Additional findings as mentioned above.      Assessment :     79 y/o woman hx of HTN and with remote hx of kidney stones admitted with 1 day hx of severe chills at home with fever and right sided flank pain , found to have acute right sided pyelonephritis with possibility of developing abscess although clinically appears non toxic and has no significant  flank tenderness  . Based on her presentation she may be bacteremic . So far all cs negative . ?? Viral infection with bronchitis     Doubt her allergy to PCN, she has so far tolerated Meropenem well .   Plan :   - continue with IV Meropenem 1 gram q 8 hours pending cs results , if stays afebrile will change to po abx in 24 hours   - send RVP   - trend cbc / bmp   - follow cs results   - urology evaluation noted   - increase activity     Continue with present regime .  Appropriate use of antibiotics and adverse effects reviewed.    I have discussed the above plan of care with patient and her son present at bedside in detail. They expressed understanding of the treatment plan . Risks, benefits and alternatives discussed in detail. I have asked if they have any questions or concerns and appropriately addressed them to the best of my ability .      > 35 minutes spent in direct patient care reviewing  the notes, lab data/ imaging , discussion with multidisciplinary team. All questions were addressed and answered to the best of my capacity .    Thank you for allowing me to participate in the care of your patient .        Kayley Blanco MD  [phone]

## 2019-02-16 DIAGNOSIS — R50.9 FEVER, UNSPECIFIED: ICD-10-CM

## 2019-02-16 DIAGNOSIS — R53.81 OTHER MALAISE: ICD-10-CM

## 2019-02-16 LAB
ANION GAP SERPL CALC-SCNC: 8 MMOL/L — SIGNIFICANT CHANGE UP (ref 5–17)
BUN SERPL-MCNC: 8 MG/DL — SIGNIFICANT CHANGE UP (ref 7–23)
CALCIUM SERPL-MCNC: 8 MG/DL — LOW (ref 8.5–10.1)
CHLORIDE SERPL-SCNC: 110 MMOL/L — HIGH (ref 96–108)
CO2 SERPL-SCNC: 28 MMOL/L — SIGNIFICANT CHANGE UP (ref 22–31)
CREAT SERPL-MCNC: 0.44 MG/DL — LOW (ref 0.5–1.3)
GLUCOSE SERPL-MCNC: 120 MG/DL — HIGH (ref 70–99)
HCT VFR BLD CALC: 30.8 % — LOW (ref 34.5–45)
HGB BLD-MCNC: 10.6 G/DL — LOW (ref 11.5–15.5)
MAGNESIUM SERPL-MCNC: 1.9 MG/DL — SIGNIFICANT CHANGE UP (ref 1.6–2.6)
MCHC RBC-ENTMCNC: 28.7 PG — SIGNIFICANT CHANGE UP (ref 27–34)
MCHC RBC-ENTMCNC: 34.4 GM/DL — SIGNIFICANT CHANGE UP (ref 32–36)
MCV RBC AUTO: 83.5 FL — SIGNIFICANT CHANGE UP (ref 80–100)
NRBC # BLD: 0 /100 WBCS — SIGNIFICANT CHANGE UP (ref 0–0)
PLATELET # BLD AUTO: 210 K/UL — SIGNIFICANT CHANGE UP (ref 150–400)
POTASSIUM SERPL-MCNC: 3.1 MMOL/L — LOW (ref 3.5–5.3)
POTASSIUM SERPL-SCNC: 3.1 MMOL/L — LOW (ref 3.5–5.3)
RBC # BLD: 3.69 M/UL — LOW (ref 3.8–5.2)
RBC # FLD: 14 % — SIGNIFICANT CHANGE UP (ref 10.3–14.5)
SODIUM SERPL-SCNC: 146 MMOL/L — HIGH (ref 135–145)
WBC # BLD: 9.11 K/UL — SIGNIFICANT CHANGE UP (ref 3.8–10.5)
WBC # FLD AUTO: 9.11 K/UL — SIGNIFICANT CHANGE UP (ref 3.8–10.5)

## 2019-02-16 PROCEDURE — 71250 CT THORAX DX C-: CPT | Mod: 26

## 2019-02-16 RX ORDER — DEXTROSE MONOHYDRATE, SODIUM CHLORIDE, AND POTASSIUM CHLORIDE 50; .745; 4.5 G/1000ML; G/1000ML; G/1000ML
1000 INJECTION, SOLUTION INTRAVENOUS
Qty: 0 | Refills: 0 | Status: DISCONTINUED | OUTPATIENT
Start: 2019-02-16 | End: 2019-02-17

## 2019-02-16 RX ORDER — POTASSIUM CHLORIDE 20 MEQ
40 PACKET (EA) ORAL ONCE
Qty: 0 | Refills: 0 | Status: COMPLETED | OUTPATIENT
Start: 2019-02-16 | End: 2019-02-16

## 2019-02-16 RX ORDER — VANCOMYCIN HCL 1 G
1000 VIAL (EA) INTRAVENOUS ONCE
Qty: 0 | Refills: 0 | Status: COMPLETED | OUTPATIENT
Start: 2019-02-16 | End: 2019-02-16

## 2019-02-16 RX ORDER — MEROPENEM 1 G/30ML
1000 INJECTION INTRAVENOUS EVERY 8 HOURS
Qty: 0 | Refills: 0 | Status: DISCONTINUED | OUTPATIENT
Start: 2019-02-16 | End: 2019-02-19

## 2019-02-16 RX ORDER — VANCOMYCIN HCL 1 G
VIAL (EA) INTRAVENOUS
Qty: 0 | Refills: 0 | Status: DISCONTINUED | OUTPATIENT
Start: 2019-02-16 | End: 2019-02-16

## 2019-02-16 RX ORDER — VANCOMYCIN HCL 1 G
1000 VIAL (EA) INTRAVENOUS EVERY 12 HOURS
Qty: 0 | Refills: 0 | Status: COMPLETED | OUTPATIENT
Start: 2019-02-17 | End: 2019-02-20

## 2019-02-16 RX ADMIN — ATENOLOL 25 MILLIGRAM(S): 25 TABLET ORAL at 17:35

## 2019-02-16 RX ADMIN — MEROPENEM 100 MILLIGRAM(S): 1 INJECTION INTRAVENOUS at 05:36

## 2019-02-16 RX ADMIN — Medication 650 MILLIGRAM(S): at 17:40

## 2019-02-16 RX ADMIN — MEROPENEM 100 MILLIGRAM(S): 1 INJECTION INTRAVENOUS at 17:35

## 2019-02-16 RX ADMIN — Medication 81 MILLIGRAM(S): at 12:37

## 2019-02-16 RX ADMIN — Medication 650 MILLIGRAM(S): at 17:35

## 2019-02-16 RX ADMIN — DEXTROSE MONOHYDRATE, SODIUM CHLORIDE, AND POTASSIUM CHLORIDE 80 MILLILITER(S): 50; .745; 4.5 INJECTION, SOLUTION INTRAVENOUS at 17:35

## 2019-02-16 RX ADMIN — MEROPENEM 100 MILLIGRAM(S): 1 INJECTION INTRAVENOUS at 23:20

## 2019-02-16 RX ADMIN — Medication 250 MILLIGRAM(S): at 19:50

## 2019-02-16 RX ADMIN — ENOXAPARIN SODIUM 40 MILLIGRAM(S): 100 INJECTION SUBCUTANEOUS at 12:37

## 2019-02-16 RX ADMIN — ATENOLOL 25 MILLIGRAM(S): 25 TABLET ORAL at 05:36

## 2019-02-16 RX ADMIN — Medication 40 MILLIEQUIVALENT(S): at 12:37

## 2019-02-16 NOTE — PROGRESS NOTE ADULT - SUBJECTIVE AND OBJECTIVE BOX
Patient is a 78y old  Female who presents with a chief complaint of fever, abdominal pain (16 Feb 2019 09:20)      INTERVAL HPI/OVERNIGHT EVENTS: Patient seen and examined. NAD. Feels weaker today.    Vital Signs Last 24 Hrs  T(C): 37 (16 Feb 2019 08:35), Max: 37.9 (15 Feb 2019 22:00)  T(F): 98.6 (16 Feb 2019 08:35), Max: 100.3 (15 Feb 2019 22:00)  HR: 89 (16 Feb 2019 08:35) (86 - 101)  BP: 111/72 (16 Feb 2019 08:35) (111/72 - 124/73)  BP(mean): --  RR: 18 (16 Feb 2019 08:35) (17 - 18)  SpO2: 96% (16 Feb 2019 08:35) (94% - 98%)    02-16    146<H>  |  110<H>  |  8   ----------------------------<  120<H>  3.1<L>   |  28  |  0.44<L>    Ca    8.0<L>      16 Feb 2019 05:19  Mg     1.9     02-16                            10.6   9.11  )-----------( 210      ( 16 Feb 2019 05:19 )             30.8       CAPILLARY BLOOD GLUCOSE                  acetaminophen   Tablet .. 650 milliGRAM(s) Oral every 6 hours PRN  amLODIPine   Tablet 5 milliGRAM(s) Oral daily  aspirin enteric coated 81 milliGRAM(s) Oral daily  ATENolol  Tablet 25 milliGRAM(s) Oral every 12 hours  enoxaparin Injectable 40 milliGRAM(s) SubCutaneous daily  meropenem  IVPB      meropenem  IVPB 1000 milliGRAM(s) IV Intermittent every 8 hours              REVIEW OF SYSTEMS:  CONSTITUTIONAL: No fever, no weight loss, or + fatigue  NECK: No pain, no stiffness  RESPIRATORY: No cough, no wheezing, no chills, no hemoptysis, No shortness of breath  CARDIOVASCULAR: No chest pain, no palpitations, no dizziness, no leg swelling  GASTROINTESTINAL: No abdominal pain. No nausea, no vomiting, no hematemesis; No diarrhea, no constipation. No melena, no hematochezia.  GENITOURINARY: No dysuria, no frequency, no hematuria, no incontinence  NEUROLOGICAL: No headaches, no loss of strength, no numbness, no tremors  SKIN: No itching, no burning  MUSCULOSKELETAL: No joint pain, no swelling; No muscle, no back, no extremity pain  PSYCHIATRIC: No depression, no mood swings,   HEME/LYMPH: No easy bruising, no bleeding gums  ALLERY AND IMMUNOLOGIC: No hives       Consultant(s) Notes Reviewed:  [X] YES  [ ] NO    PHYSICAL EXAM:  GENERAL: NAD  HEAD:  Atraumatic, Normocephalic  EYES: EOMI, PERRLA, conjunctiva and sclera clear  ENMT: No tonsillar erythema, exudates, or enlargement; Moist mucous membranes  NECK: Supple, No JVD  NERVOUS SYSTEM:  Awake & alert  CHEST/LUNG: Clear to auscultation bilaterally; No rales, rhonchi, wheezing,  HEART: Regular rate and rhythm  ABDOMEN: Soft, Nontender, Nondistended; Bowel sounds present  EXTREMITIES:  No clubbing, cyanosis, or edema  LYMPH: No lymphadenopathy noted  SKIN: No rashes      Advanced care planning discussed with patient/family [X] YES   [ ] NO    Advanced care planning discussed with patient/family. Advanced care planning forms reviewed/discussed/completed. 20 minutes spent.

## 2019-02-16 NOTE — PROGRESS NOTE ADULT - PROBLEM SELECTOR PLAN 1
Improved on meropenum. C&S all negative. Abiots per ID, will need f/u in our office after abiots done, so f/u renal studies can be done

## 2019-02-16 NOTE — PROGRESS NOTE ADULT - SUBJECTIVE AND OBJECTIVE BOX
ID progress note     Name: JONATHAN HERNANDEZ  Age: 78y  Gender: Female  MRN: [id number]    Interval History-- Events noted, has low grade fever, feels very tired and has unsteady gait . She has chronic cough with ? bronchiectasis left lung   Notes reviewed    Past Medical History--  Hypertension  No significant past surgical history      For details regarding the patient's social history, family history, and other miscellaneous elements, please refer the initial infectious diseases consultation and/or the admitting history and physical examination for this admission.    Allergies--  Allergies    penicillin (Unknown)    Intolerances        Medications--  Antibiotics:  meropenem  IVPB      meropenem  IVPB 1000 milliGRAM(s) IV Intermittent every 8 hours    Immunologic:    Other:  acetaminophen   Tablet .. PRN  amLODIPine   Tablet  aspirin enteric coated  ATENolol  Tablet  enoxaparin Injectable  sodium chloride 0.45% with potassium chloride 20 mEq/L      Review of Systems--  A 10-point review of systems was obtained.     Pertinent positives and negatives--  Constitutional: No fevers. No Chills. No Rigors.   Cardiovascular: No chest pain. No palpitations.  Respiratory: No shortness of breath. No cough.  Gastrointestinal: No nausea or vomiting. No diarrhea or constipation.   Psychiatric: Pleasant. Appropriate affect.    Review of systems otherwise negative except as previously noted.    Physical Examination--  Vital Signs: T(F): 98.6 (02-16-19 @ 08:35), Max: 100.3 (02-15-19 @ 22:00)  HR: 89 (02-16-19 @ 08:35)  BP: 111/72 (02-16-19 @ 08:35)  RR: 18 (02-16-19 @ 08:35)  SpO2: 96% (02-16-19 @ 08:35)  Wt(kg): --  General: Nontoxic-appearing Female in no acute distress.  HEENT: AT/NC. PERRL. EOMI. Anicteric. Conjunctiva pink and moist. Oropharynx clear. Dentition fair.  Neck: Not rigid. No sense of mass.  Nodes: None palpable.  Lungs: Coarse breath sounds  bilaterally without rales, wheezing or rhonchi  Heart: Regular rate and rhythm. No Murmur. No rub. No gallop. No palpable thrill.  Abdomen: Bowel sounds present and normoactive. Soft. Nondistended. Nontender. No sense of mass. No organomegaly.  Back: No spinal tenderness. No costovertebral angle tenderness.   Extremities: No cyanosis or clubbing. No edema.   Skin: Warm. Dry. Good turgor. No rash. No vasculitic stigmata.  Psychiatric: Appropriate affect and mood for situation.         Laboratory Studies--  CBC                        10.6   9.11  )-----------( 210      ( 16 Feb 2019 05:19 )             30.8       Chemistries  02-16    146<H>  |  110<H>  |  8   ----------------------------<  120<H>  3.1<L>   |  28  |  0.44<L>    Ca    8.0<L>      16 Feb 2019 05:19  Mg     1.9     02-16        Culture Data    FLU A B RSV Detection by PCR (02.15.19 @ 15:37)    Flu A Result: [facility]: The Flu A B RSV assay is a Real-Time PCR test for the qualitative  detection and differentiation of Influenza A, Influenza B, and  Respiratory Syncytial Virus on nasopharyngeal swabs. The results should  be interpreted in the context of all clinical and laboratory findings.    Flu B Result: [facility]    RSV Result: [facility]        Culture - Urine (collected 14 Feb 2019 11:07)  Source: .Urine Clean Catch (Midstream)  Final Report (15 Feb 2019 07:53):    <10,000 CFU/ml Normal Urogenital jessee present    Culture - Blood (collected 14 Feb 2019 09:23)  Source: .Blood Blood-Peripheral  Preliminary Report (15 Feb 2019 10:00):    No growth to date.    Culture - Blood (collected 14 Feb 2019 09:23)  Source: .Blood Blood-Peripheral  Preliminary Report (15 Feb 2019 10:00):    No growth to date.      RADIOLOGY:  CT Abdomen and Pelvis w/ Oral Cont and w/ IV Cont (02.14.19 @ 04:58) >  IMPRESSION:    Heterogeneously enhancing edematous right kidney with focal   low-attenuation opacity in the upper pole posteriorly. Slight prominence   of the right renal pelvis with peripheral wall enhancement. Findings are   likely in keeping with ascending tract infection or pyelonephritis.   Possibility of acute focal bacterial nephritis or developing intrarenal   abscess considered. Mild right perinephric stranding/trace fluid.   Scattered punctate nonobstructive bilateral renal calculi, more   pronounced on the right side.    Incompletely characterized left inferior pole renal hypodensity measuring   up to 1.6 cm. Additional too small to characterize bilateral renal   hypo densities. Consider nonemergent correlation with renal ultrasound for   better characterization.     Appendix is not visualized without secondary signs of acute appendicitis.    Additional findings as mentioned above.      Assessment :     77 y/o woman hx of HTN and with remote hx of kidney stones admitted with 1 day hx of severe chills at home with fever and right sided flank pain , found to have acute right sided pyelonephritis with possibility of developing abscess although clinically appears non toxic and has no significant  flank tenderness  . Based on her presentation she may be bacteremic . So far all cs negative . ?? Viral infection with bronchitis     Doubt her allergy to PCN, she has so far tolerated Meropenem well .   Plan :   - will get CT chest non contrast to rule out pn  - change to po Vantin 200 mg bid   - trend cbc / bmp   - follow cs results   - urology evaluation noted   - increase activity     Continue with present regime .  Appropriate use of antibiotics and adverse effects reviewed.    I have discussed the above plan of care with patient and her son in detail. They expressed understanding of the treatment plan . Risks, benefits and alternatives discussed in detail. I have asked if they have any questions or concerns and appropriately addressed them to the best of my ability .      > 35 minutes spent in direct patient care reviewing  the notes, lab data/ imaging , discussion with multidisciplinary team. All questions were addressed and answered to the best of my capacity .    Thank you for allowing me to participate in the care of your patient .        Kayley Blanco MD  [phone] ID progress note     Name: JONATHAN HERNANDEZ  Age: 78y  Gender: Female  MRN: [id number]    Interval History-- Events noted, has low grade fever, feels very tired and has unsteady gait . She has chronic cough with ? bronchiectasis left lung   Notes reviewed    Past Medical History--  Hypertension  No significant past surgical history      For details regarding the patient's social history, family history, and other miscellaneous elements, please refer the initial infectious diseases consultation and/or the admitting history and physical examination for this admission.    Allergies--  Allergies    penicillin (Unknown)    Intolerances        Medications--  Antibiotics:  meropenem  IVPB      meropenem  IVPB 1000 milliGRAM(s) IV Intermittent every 8 hours    Immunologic:    Other:  acetaminophen   Tablet .. PRN  amLODIPine   Tablet  aspirin enteric coated  ATENolol  Tablet  enoxaparin Injectable  sodium chloride 0.45% with potassium chloride 20 mEq/L      Review of Systems--  A 10-point review of systems was obtained.     Pertinent positives and negatives--  Constitutional: No fevers. No Chills. No Rigors.   Cardiovascular: No chest pain. No palpitations.  Respiratory: No shortness of breath. No cough.  Gastrointestinal: No nausea or vomiting. No diarrhea or constipation.   Psychiatric: Pleasant. Appropriate affect.    Review of systems otherwise negative except as previously noted.    Physical Examination--  Vital Signs: T(F): 98.6 (02-16-19 @ 08:35), Max: 100.3 (02-15-19 @ 22:00)  HR: 89 (02-16-19 @ 08:35)  BP: 111/72 (02-16-19 @ 08:35)  RR: 18 (02-16-19 @ 08:35)  SpO2: 96% (02-16-19 @ 08:35)  Wt(kg): --  General: Nontoxic-appearing Female in no acute distress.  HEENT: AT/NC. PERRL. EOMI. Anicteric. Conjunctiva pink and moist. Oropharynx clear. Dentition fair.  Neck: Not rigid. No sense of mass.  Nodes: None palpable.  Lungs: Coarse breath sounds  bilaterally without rales, wheezing or rhonchi  Heart: Regular rate and rhythm. No Murmur. No rub. No gallop. No palpable thrill.  Abdomen: Bowel sounds present and normoactive. Soft. Nondistended. Nontender. No sense of mass. No organomegaly.  Back: No spinal tenderness. No costovertebral angle tenderness.   Extremities: No cyanosis or clubbing. No edema.   Skin: Warm. Dry. Good turgor. No rash. No vasculitic stigmata.  Psychiatric: Appropriate affect and mood for situation.         Laboratory Studies--  CBC                        10.6   9.11  )-----------( 210      ( 16 Feb 2019 05:19 )             30.8       Chemistries  02-16    146<H>  |  110<H>  |  8   ----------------------------<  120<H>  3.1<L>   |  28  |  0.44<L>    Ca    8.0<L>      16 Feb 2019 05:19  Mg     1.9     02-16        Culture Data    FLU A B RSV Detection by PCR (02.15.19 @ 15:37)    Flu A Result: [facility]: The Flu A B RSV assay is a Real-Time PCR test for the qualitative  detection and differentiation of Influenza A, Influenza B, and  Respiratory Syncytial Virus on nasopharyngeal swabs. The results should  be interpreted in the context of all clinical and laboratory findings.    Flu B Result: [facility]    RSV Result: [facility]        Culture - Urine (collected 14 Feb 2019 11:07)  Source: .Urine Clean Catch (Midstream)  Final Report (15 Feb 2019 07:53):    <10,000 CFU/ml Normal Urogenital jessee present    Culture - Blood (collected 14 Feb 2019 09:23)  Source: .Blood Blood-Peripheral  Preliminary Report (15 Feb 2019 10:00):    No growth to date.    Culture - Blood (collected 14 Feb 2019 09:23)  Source: .Blood Blood-Peripheral  Preliminary Report (15 Feb 2019 10:00):    No growth to date.      RADIOLOGY:  CT Abdomen and Pelvis w/ Oral Cont and w/ IV Cont (02.14.19 @ 04:58) >  IMPRESSION:    Heterogeneously enhancing edematous right kidney with focal   low-attenuation opacity in the upper pole posteriorly. Slight prominence   of the right renal pelvis with peripheral wall enhancement. Findings are   likely in keeping with ascending tract infection or pyelonephritis.   Possibility of acute focal bacterial nephritis or developing intrarenal   abscess considered. Mild right perinephric stranding/trace fluid.   Scattered punctate nonobstructive bilateral renal calculi, more   pronounced on the right side.    Incompletely characterized left inferior pole renal hypodensity measuring   up to 1.6 cm. Additional too small to characterize bilateral renal   hypo densities. Consider nonemergent correlation with renal ultrasound for   better characterization.     Appendix is not visualized without secondary signs of acute appendicitis.    Additional findings as mentioned above.      Assessment :     79 y/o woman hx of HTN and with remote hx of kidney stones admitted with 1 day hx of severe chills at home with fever and right sided flank pain , found to have acute right sided pyelonephritis with possibility of developing abscess although clinically appears non toxic and has no significant  flank tenderness  . Based on her presentation she may be bacteremic . So far all cs negative . ?? Viral infection with bronchitis     Doubt her allergy to PCN, she has so far tolerated Meropenem well .   Plan :   - will get CT chest non contrast to rule out pn ,if CT negative then change to po Levaquin 500 mg daily and dc home tomorrow    - trend cbc / bmp   - follow cs results   - urology evaluation noted   - increase activity     Continue with present regime .  Appropriate use of antibiotics and adverse effects reviewed.    I have discussed the above plan of care with patient and her son in detail. They expressed understanding of the treatment plan . Risks, benefits and alternatives discussed in detail. I have asked if they have any questions or concerns and appropriately addressed them to the best of my ability .      > 35 minutes spent in direct patient care reviewing  the notes, lab data/ imaging , discussion with multidisciplinary team. All questions were addressed and answered to the best of my capacity .    Thank you for allowing me to participate in the care of your patient .        Kayley Blanco MD  [phone]

## 2019-02-16 NOTE — PROGRESS NOTE ADULT - SUBJECTIVE AND OBJECTIVE BOX
INTERVAL HPI/OVERNIGHT EVENTS: feels better, but weak    MEDICATIONS  (STANDING):  amLODIPine   Tablet 5 milliGRAM(s) Oral daily  aspirin enteric coated 81 milliGRAM(s) Oral daily  ATENolol  Tablet 25 milliGRAM(s) Oral every 12 hours  enoxaparin Injectable 40 milliGRAM(s) SubCutaneous daily  meropenem  IVPB      meropenem  IVPB 1000 milliGRAM(s) IV Intermittent every 8 hours    MEDICATIONS  (PRN):  acetaminophen   Tablet .. 650 milliGRAM(s) Oral every 6 hours PRN Temp greater or equal to 38C (100.4F), Mild Pain (1 - 3)        Vital Signs Last 24 Hrs  T(C): 37 (16 Feb 2019 08:35), Max: 37.9 (15 Feb 2019 22:00)  T(F): 98.6 (16 Feb 2019 08:35), Max: 100.3 (15 Feb 2019 22:00)  HR: 89 (16 Feb 2019 08:35) (86 - 101)  BP: 111/72 (16 Feb 2019 08:35) (111/72 - 124/73)  BP(mean): --  RR: 18 (16 Feb 2019 08:35) (17 - 18)  SpO2: 96% (16 Feb 2019 08:35) (94% - 98%)    PHYSICAL EXAM:    ABDOMEN: soft, minimal tenderness in ruq, no cvat now    LABS:                        10.6   9.11  )-----------( 210      ( 16 Feb 2019 05:19 )             30.8     02-16    146<H>  |  110<H>  |  8   ----------------------------<  120<H>  3.1<L>   |  28  |  0.44<L>    Ca    8.0<L>      16 Feb 2019 05:19  Mg     1.9     02-16          Urine culture:  02-14 @ 11:07 --   <10,000 CFU/ml Normal Urogenital jessee present  Urine culture:  02-14 @ 09:23 --   No growth to date.      RADIOLOGY & ADDITIONAL TESTS:

## 2019-02-16 NOTE — PROGRESS NOTE ADULT - PROBLEM SELECTOR PLAN 4
Will give IVF -- patient slightly hypernatremic  Otherwise good appetite  PT eval Will give IVF -- patient slightly hypernatremic  Replete potassium  Otherwise good appetite  PT eval

## 2019-02-17 DIAGNOSIS — J98.09 OTHER DISEASES OF BRONCHUS, NOT ELSEWHERE CLASSIFIED: ICD-10-CM

## 2019-02-17 LAB
ANION GAP SERPL CALC-SCNC: 6 MMOL/L — SIGNIFICANT CHANGE UP (ref 5–17)
BUN SERPL-MCNC: 7 MG/DL — SIGNIFICANT CHANGE UP (ref 7–23)
CALCIUM SERPL-MCNC: 8.1 MG/DL — LOW (ref 8.5–10.1)
CHLORIDE SERPL-SCNC: 107 MMOL/L — SIGNIFICANT CHANGE UP (ref 96–108)
CO2 SERPL-SCNC: 29 MMOL/L — SIGNIFICANT CHANGE UP (ref 22–31)
CREAT SERPL-MCNC: 0.4 MG/DL — LOW (ref 0.5–1.3)
GLUCOSE SERPL-MCNC: 142 MG/DL — HIGH (ref 70–99)
HCT VFR BLD CALC: 29.8 % — LOW (ref 34.5–45)
HGB BLD-MCNC: 10.2 G/DL — LOW (ref 11.5–15.5)
MAGNESIUM SERPL-MCNC: 2.1 MG/DL — SIGNIFICANT CHANGE UP (ref 1.6–2.6)
MCHC RBC-ENTMCNC: 28.6 PG — SIGNIFICANT CHANGE UP (ref 27–34)
MCHC RBC-ENTMCNC: 34.2 GM/DL — SIGNIFICANT CHANGE UP (ref 32–36)
MCV RBC AUTO: 83.5 FL — SIGNIFICANT CHANGE UP (ref 80–100)
NRBC # BLD: 0 /100 WBCS — SIGNIFICANT CHANGE UP (ref 0–0)
PLATELET # BLD AUTO: 241 K/UL — SIGNIFICANT CHANGE UP (ref 150–400)
POTASSIUM SERPL-MCNC: 3.6 MMOL/L — SIGNIFICANT CHANGE UP (ref 3.5–5.3)
POTASSIUM SERPL-SCNC: 3.6 MMOL/L — SIGNIFICANT CHANGE UP (ref 3.5–5.3)
PROCALCITONIN SERPL-MCNC: 0.17 NG/ML — HIGH (ref 0–0.04)
RBC # BLD: 3.57 M/UL — LOW (ref 3.8–5.2)
RBC # FLD: 13.9 % — SIGNIFICANT CHANGE UP (ref 10.3–14.5)
SODIUM SERPL-SCNC: 142 MMOL/L — SIGNIFICANT CHANGE UP (ref 135–145)
T4 AB SER-ACNC: 5.7 UG/DL — SIGNIFICANT CHANGE UP (ref 4.6–12)
TSH SERPL-MCNC: 4.78 UIU/ML — HIGH (ref 0.36–3.74)
WBC # BLD: 6.65 K/UL — SIGNIFICANT CHANGE UP (ref 3.8–10.5)
WBC # FLD AUTO: 6.65 K/UL — SIGNIFICANT CHANGE UP (ref 3.8–10.5)

## 2019-02-17 PROCEDURE — 71045 X-RAY EXAM CHEST 1 VIEW: CPT | Mod: 26

## 2019-02-17 RX ORDER — ALBUTEROL 90 UG/1
2.5 AEROSOL, METERED ORAL EVERY 12 HOURS
Qty: 0 | Refills: 0 | Status: DISCONTINUED | OUTPATIENT
Start: 2019-02-17 | End: 2019-02-21

## 2019-02-17 RX ADMIN — ATENOLOL 25 MILLIGRAM(S): 25 TABLET ORAL at 17:19

## 2019-02-17 RX ADMIN — ATENOLOL 25 MILLIGRAM(S): 25 TABLET ORAL at 05:50

## 2019-02-17 RX ADMIN — Medication 81 MILLIGRAM(S): at 12:34

## 2019-02-17 RX ADMIN — MEROPENEM 100 MILLIGRAM(S): 1 INJECTION INTRAVENOUS at 07:26

## 2019-02-17 RX ADMIN — ALBUTEROL 2.5 MILLIGRAM(S): 90 AEROSOL, METERED ORAL at 08:29

## 2019-02-17 RX ADMIN — ENOXAPARIN SODIUM 40 MILLIGRAM(S): 100 INJECTION SUBCUTANEOUS at 12:34

## 2019-02-17 RX ADMIN — MEROPENEM 100 MILLIGRAM(S): 1 INJECTION INTRAVENOUS at 16:33

## 2019-02-17 RX ADMIN — Medication 250 MILLIGRAM(S): at 19:45

## 2019-02-17 RX ADMIN — Medication 250 MILLIGRAM(S): at 06:50

## 2019-02-17 RX ADMIN — ALBUTEROL 2.5 MILLIGRAM(S): 90 AEROSOL, METERED ORAL at 20:50

## 2019-02-17 RX ADMIN — MEROPENEM 100 MILLIGRAM(S): 1 INJECTION INTRAVENOUS at 23:35

## 2019-02-17 RX ADMIN — AMLODIPINE BESYLATE 5 MILLIGRAM(S): 2.5 TABLET ORAL at 05:51

## 2019-02-17 NOTE — PROGRESS NOTE ADULT - SUBJECTIVE AND OBJECTIVE BOX
INTERVAL HPI/OVERNIGHT EVENTS: minimal pain now, but having temps again, dx with PNA    MEDICATIONS  (STANDING):  ALBUTerol    0.083% 2.5 milliGRAM(s) Nebulizer every 12 hours  amLODIPine   Tablet 5 milliGRAM(s) Oral daily  aspirin enteric coated 81 milliGRAM(s) Oral daily  ATENolol  Tablet 25 milliGRAM(s) Oral every 12 hours  enoxaparin Injectable 40 milliGRAM(s) SubCutaneous daily  meropenem  IVPB 1000 milliGRAM(s) IV Intermittent every 8 hours  vancomycin  IVPB 1000 milliGRAM(s) IV Intermittent every 12 hours    MEDICATIONS  (PRN):  acetaminophen   Tablet .. 650 milliGRAM(s) Oral every 6 hours PRN Temp greater or equal to 38C (100.4F), Mild Pain (1 - 3)        Vital Signs Last 24 Hrs  T(C): 37.3 (17 Feb 2019 08:14), Max: 38.7 (16 Feb 2019 17:39)  T(F): 99.2 (17 Feb 2019 08:14), Max: 101.7 (16 Feb 2019 17:39)  HR: 88 (17 Feb 2019 08:14) (88 - 116)  BP: 128/77 (17 Feb 2019 08:14) (123/77 - 134/77)  BP(mean): --  RR: 18 (17 Feb 2019 08:14) (17 - 18)  SpO2: 98% (17 Feb 2019 08:14) (96% - 98%)    PHYSICAL EXAM:    ABDOMEN: no cvat, non tender      LABS:                        10.2   6.65  )-----------( 241      ( 17 Feb 2019 08:14 )             29.8     02-16    146<H>  |  110<H>  |  8   ----------------------------<  120<H>  3.1<L>   |  28  |  0.44<L>    Ca    8.0<L>      16 Feb 2019 05:19  Mg     1.9     02-16          Urine culture:  02-14 @ 11:07 --   <10,000 CFU/ml Normal Urogenital jessee present  Urine culture:  02-14 @ 09:23 --   No growth to date.      RADIOLOGY & ADDITIONAL TESTS:

## 2019-02-17 NOTE — CONSULT NOTE ADULT - SUBJECTIVE AND OBJECTIVE BOX
Date/Time Patient Seen:  		  Referring MD:   Data Reviewed	       Patient is a 78y old  Female who presents with a chief complaint of fever, abdominal pain (16 Feb 2019 15:36)      Subjective/HPI     PAST MEDICAL & SURGICAL HISTORY:  Hypertension  No significant past surgical history        Medication list         MEDICATIONS  (STANDING):  ALBUTerol    0.083% 2.5 milliGRAM(s) Nebulizer every 12 hours  amLODIPine   Tablet 5 milliGRAM(s) Oral daily  aspirin enteric coated 81 milliGRAM(s) Oral daily  ATENolol  Tablet 25 milliGRAM(s) Oral every 12 hours  enoxaparin Injectable 40 milliGRAM(s) SubCutaneous daily  meropenem  IVPB 1000 milliGRAM(s) IV Intermittent every 8 hours  vancomycin  IVPB 1000 milliGRAM(s) IV Intermittent every 12 hours    MEDICATIONS  (PRN):  acetaminophen   Tablet .. 650 milliGRAM(s) Oral every 6 hours PRN Temp greater or equal to 38C (100.4F), Mild Pain (1 - 3)         Vitals log        ICU Vital Signs Last 24 Hrs  T(C): 37.1 (17 Feb 2019 05:46), Max: 38.7 (16 Feb 2019 17:39)  T(F): 98.8 (17 Feb 2019 05:46), Max: 101.7 (16 Feb 2019 17:39)  HR: 116 (17 Feb 2019 05:46) (89 - 116)  BP: 134/77 (17 Feb 2019 05:46) (111/72 - 134/77)  BP(mean): --  ABP: --  ABP(mean): --  RR: 18 (17 Feb 2019 05:46) (17 - 18)  SpO2: 96% (17 Feb 2019 05:46) (96% - 98%)           Input and Output:  I&O's Detail    16 Feb 2019 07:01  -  17 Feb 2019 07:00  --------------------------------------------------------  IN:    sodium chloride 0.45% with potassium chloride 20 mEq/L: 960 mL    Solution: 50 mL    Solution: 250 mL  Total IN: 1260 mL    OUT:  Total OUT: 0 mL    Total NET: 1260 mL          Lab Data                        10.6   9.11  )-----------( 210      ( 16 Feb 2019 05:19 )             30.8     02-16    146<H>  |  110<H>  |  8   ----------------------------<  120<H>  3.1<L>   |  28  |  0.44<L>    Ca    8.0<L>      16 Feb 2019 05:19  Mg     1.9     02-16              Review of Systems	      Objective     Physical Examination    heart s1s2  lung dec BS  abd soft      Pertinent Lab findings & Imaging      Jany:  NO   Adequate UO     I&O's Detail    16 Feb 2019 07:01  -  17 Feb 2019 07:00  --------------------------------------------------------  IN:    sodium chloride 0.45% with potassium chloride 20 mEq/L: 960 mL    Solution: 50 mL    Solution: 250 mL  Total IN: 1260 mL    OUT:  Total OUT: 0 mL    Total NET: 1260 mL               Discussed with:     Cultures:	        Radiology

## 2019-02-17 NOTE — CHART NOTE - NSCHARTNOTEFT_GEN_A_CORE
RN called as patient was wheezing.  Patient was seen and examined at bedside.  Patient denies having SOB or difficulty breathing, denies chest pain or palpitations.  Patient has cough, but states that it is chronic.  Patient currently being treated for pyelo, received 2 boluses and on IVF.    Vital Signs Last 24 Hrs  T(C): 37.1 (17 Feb 2019 05:46), Max: 38.7 (16 Feb 2019 17:39)  T(F): 98.8 (17 Feb 2019 05:46), Max: 101.7 (16 Feb 2019 17:39)  HR: 116 (17 Feb 2019 05:46) (89 - 116)  BP: 134/77 (17 Feb 2019 05:46) (111/72 - 134/77)  RR: 18 (17 Feb 2019 05:46) (17 - 18)  SpO2: 96% (17 Feb 2019 05:46) (96% - 98%)    Physical Exam  General: No apparent distress, coughing  Head: normocephalic, atraumatic  Heart: RRR, S1, S2, no murmurs  Chest: rales present throughout lungs, more significant on R side  Abd: BS+, soft, NT, ND      Assessment and Plan:  78 year old F with PMH HTN here for pyelonephritis    -Will d/c fluids  -CXR ordered STAT to evaluate for fluid overload status  -Consider ordereing lasix 40mg IVP x1, pending results of CXR (will sign out to dayteam)  -RN to inform of any acute changes RN called as patient was wheezing.  Patient was seen and examined at bedside.  Patient denies having SOB or difficulty breathing, denies chest pain or palpitations.  Patient has cough, but states that it is chronic.  Patient currently being treated for pyelo, received 2 boluses and on IVF.    Vital Signs Last 24 Hrs  T(C): 37.1 (17 Feb 2019 05:46), Max: 38.7 (16 Feb 2019 17:39)  T(F): 98.8 (17 Feb 2019 05:46), Max: 101.7 (16 Feb 2019 17:39)  HR: 116 (17 Feb 2019 05:46) (89 - 116)  BP: 134/77 (17 Feb 2019 05:46) (111/72 - 134/77)  RR: 18 (17 Feb 2019 05:46) (17 - 18)  SpO2: 96% (17 Feb 2019 05:46) (96% - 98%)    Physical Exam  General: No apparent distress, coughing  Head: normocephalic, atraumatic  Heart: RRR, S1, S2, no murmurs  Chest: rales present throughout lungs, more significant on R side  Abd: BS+, soft, NT, ND      Assessment and Plan:  78 year old F with PMH HTN here for pyelonephritis    -Will d/c fluids  -CXR ordered STAT to evaluate for fluid overload status  -Consider ordereing lasix 40mg IVP x1, pending results of CXR (will sign out to dayteam)  -RN to inform of any acute changes  -Discussed plan with senior resident.

## 2019-02-17 NOTE — PROGRESS NOTE ADULT - SUBJECTIVE AND OBJECTIVE BOX
Patient is a 78y old  Female who presents with a chief complaint of fever, abdominal pain (17 Feb 2019 11:07)      INTERVAL HPI/OVERNIGHT EVENTS: Patient seen and examined. NAD. No complaints. Feels slightly better today.    Vital Signs Last 24 Hrs  T(C): 37.3 (17 Feb 2019 08:14), Max: 38.7 (16 Feb 2019 17:39)  T(F): 99.2 (17 Feb 2019 08:14), Max: 101.7 (16 Feb 2019 17:39)  HR: 92 (17 Feb 2019 08:40) (88 - 116)  BP: 128/77 (17 Feb 2019 08:14) (123/77 - 134/77)  BP(mean): --  RR: 18 (17 Feb 2019 08:14) (17 - 18)  SpO2: 97% (17 Feb 2019 08:40) (96% - 98%)    02-17    142  |  107  |  7   ----------------------------<  142<H>  3.6   |  29  |  0.40<L>    Ca    8.1<L>      17 Feb 2019 08:14  Mg     2.1     02-17                            10.2   6.65  )-----------( 241      ( 17 Feb 2019 08:14 )             29.8       CAPILLARY BLOOD GLUCOSE                  acetaminophen   Tablet .. 650 milliGRAM(s) Oral every 6 hours PRN  ALBUTerol    0.083% 2.5 milliGRAM(s) Nebulizer every 12 hours  amLODIPine   Tablet 5 milliGRAM(s) Oral daily  aspirin enteric coated 81 milliGRAM(s) Oral daily  ATENolol  Tablet 25 milliGRAM(s) Oral every 12 hours  enoxaparin Injectable 40 milliGRAM(s) SubCutaneous daily  meropenem  IVPB 1000 milliGRAM(s) IV Intermittent every 8 hours  vancomycin  IVPB 1000 milliGRAM(s) IV Intermittent every 12 hours              REVIEW OF SYSTEMS:  CONSTITUTIONAL: + fever, no weight loss, or + fatigue  NECK: No pain, no stiffness  RESPIRATORY: No cough, no wheezing, no chills, no hemoptysis, No shortness of breath  CARDIOVASCULAR: No chest pain, no palpitations, no dizziness, no leg swelling  GASTROINTESTINAL: No abdominal pain. No nausea, no vomiting, no hematemesis; No diarrhea, no constipation. No melena, no hematochezia.  GENITOURINARY: No dysuria, no frequency, no hematuria, no incontinence  NEUROLOGICAL: No headaches, no loss of strength, no numbness, no tremors  SKIN: No itching, no burning  MUSCULOSKELETAL: No joint pain, no swelling; No muscle, no back, no extremity pain  PSYCHIATRIC: No depression, no mood swings,   HEME/LYMPH: No easy bruising, no bleeding gums  ALLERY AND IMMUNOLOGIC: No hives       Consultant(s) Notes Reviewed:  [X] YES  [ ] NO    PHYSICAL EXAM:  GENERAL: NAD  HEAD:  Atraumatic, Normocephalic  EYES: EOMI, PERRLA, conjunctiva and sclera clear  ENMT: No tonsillar erythema, exudates, or enlargement; Moist mucous membranes  NECK: Supple, No JVD  NERVOUS SYSTEM:  Awake & alert  CHEST/LUNG: Clear to auscultation bilaterally; No rales, rhonchi, wheezing,  HEART: Regular rate and rhythm  ABDOMEN: Soft, Nontender, Nondistended; Bowel sounds present  EXTREMITIES:  No clubbing, cyanosis, or edema  LYMPH: No lymphadenopathy noted  SKIN: No rashes      Advanced care planning discussed with patient/family [X] YES   [ ] NO    Advanced care planning discussed with patient/family. Advanced care planning forms reviewed/discussed/completed. 20 minutes spent.

## 2019-02-17 NOTE — CONSULT NOTE ADULT - PROBLEM SELECTOR RECOMMENDATION 9
mucus plugging  poss lower resp tract infection - although less likely acute - may have ROBERTO  poss Lower/ Upper urinary tract infection   ID following  on emp broad spectrum ABX  will add NEBS BID via HH[facility] to expectorate mucus and bronchospasm  may need non emergent Bronchoscopy - to eval for ROBERTO  will discuss with pt and family  cont medical supportive care  tolerating room air

## 2019-02-17 NOTE — PROGRESS NOTE ADULT - SUBJECTIVE AND OBJECTIVE BOX
ID Progress note     Name: JONATHAN HERNANDEZ  Age: 78y  Gender: Female  MRN: [id number]    Interval History-- Events noted , still with non productive cough , results of ct chest noted   Notes reviewed    Past Medical History--  Hypertension  No significant past surgical history      For details regarding the patient's social history, family history, and other miscellaneous elements, please refer the initial infectious diseases consultation and/or the admitting history and physical examination for this admission.    Allergies--  Allergies    penicillin (Unknown)    Intolerances        Medications--  Antibiotics:  meropenem  IVPB 1000 milliGRAM(s) IV Intermittent every 8 hours  vancomycin  IVPB 1000 milliGRAM(s) IV Intermittent every 12 hours    Immunologic:    Other:  acetaminophen   Tablet .. PRN  ALBUTerol    0.083%  amLODIPine   Tablet  aspirin enteric coated  ATENolol  Tablet  enoxaparin Injectable      Review of Systems--  Review of systems unable to be obtained secondary to clinical condition.    Physical Examination--    Vital Signs: T(F): 99.2 (02-17-19 @ 08:14), Max: 101.7 (02-16-19 @ 17:39)  HR: 92 (02-17-19 @ 08:40)  BP: 128/77 (02-17-19 @ 08:14)  RR: 18 (02-17-19 @ 08:14)  SpO2: 97% (02-17-19 @ 08:40)  Wt(kg): --  General: Nontoxic-appearing Female in no acute distress.  HEENT: AT/NC. PERRL. EOMI. Anicteric. Conjunctiva pink and moist. Oropharynx clear. Dentition fair.  Neck: Not rigid. No sense of mass.  Nodes: None palpable.  Lungs: Coarse breath sounds bilaterally without rales, wheezing or rhonchi  Heart: Regular rate and rhythm. No Murmur. No rub. No gallop. No palpable thrill.  Abdomen: Bowel sounds present and normoactive. Soft. Nondistended. Nontender. No sense of mass. No organomegaly.  Back: No spinal tenderness. No costovertebral angle tenderness.   Extremities: No cyanosis or clubbing. No edema.   Skin: Warm. Dry. Good turgor. No rash. No vasculitic stigmata.  Psychiatric: Appropriate affect and mood for situation.         Laboratory Studies--  CBC                        10.2   6.65  )-----------( 241      ( 17 Feb 2019 08:14 )             29.8       Chemistries  02-17    142  |  107  |  7   ----------------------------<  142<H>  3.6   |  29  |  0.40<L>    Ca    8.1<L>      17 Feb 2019 08:14  Mg     2.1     02-17    Procalcitonin, Serum (02.17.19 @ 08:14)    Procalcitonin, Serum: 0.17:     Culture Data    Culture - Urine (collected 14 Feb 2019 11:07)  Source: .Urine Clean Catch (Midstream)  Final Report (15 Feb 2019 07:53):    <10,000 CFU/ml Normal Urogenital jessee present    Culture - Blood (collected 14 Feb 2019 09:23)  Source: .Blood Blood-Peripheral  Preliminary Report (15 Feb 2019 10:00):    No growth to date.    Culture - Blood (collected 14 Feb 2019 09:23)  Source: .Blood Blood-Peripheral  Preliminary Report (15 Feb 2019 10:00):    No growth to date.    Radiology:  Xray Chest 1 View- PORTABLE-Urgent (02.17.19 @ 06:41) >  The heart size is borderline. The trachea is midline. The lungs are   clear. There is osteopenia of the bony structures.    Impression: No active pulmonary disease    CT Chest No Cont (02.16.19 @ 16:35) >  CHEST:     CHEST WALL AND LOWER NECK: Within normal limits  MEDIASTINUM AND DEVEN: Within normal limits  HEART: Extensive coronary calcifications.  VESSELS: Extensive arterial calcifications.  LUNG AND LARGE AIRWAYS: Bilateral tree-in-bud nodules and mucoid impacted   distal bronchioles most prominent right lower lobe most compatible with   infection, worsened from 2011. No pleural effusion. 8 mm right apical   nodule stable.  VISUALIZED UPPER ABDOMEN: Right posterior lobe hypodensity decrease in   size from 2011 question including hemangioma. 4 mm nonobstructing left   renal calculus.  BONES: Within normal limits.    IMPRESSION: Bilateral bronchopneumonia, including but not limited to ROBERTO,   worsened from 2011.      CT Abdomen and Pelvis w/ Oral Cont and w/ IV Cont (02.14.19 @ 04:58) >  IMPRESSION:    Heterogeneously enhancing edematous right kidney with focal   low-attenuation opacity in the upper pole posteriorly. Slight prominence   of the right renal pelvis with peripheral wall enhancement. Findings are   likely in keeping with ascending tract infection or pyelonephritis.   Possibility of acute focal bacterial nephritis or developing intrarenal   abscess considered. Mild right perinephric stranding/trace fluid.   Scattered punctate nonobstructive bilateral renal calculi, more   pronounced on the right side.    Incompletely characterized left inferior pole renal hypodensity measuring   up to 1.6 cm. Additional too small to characterize bilateral renal   hypo densities. Consider nonemergent correlation with renal ultrasound for   better characterization.     Appendix is not visualized without secondary signs of acute appendicitis.    Additional findings as mentioned above.      Assessment :     79 y/o woman hx of HTN and with remote hx of kidney stones admitted with 1 day hx of severe chills at home with fever and right sided flank pain , found to have acute right sided pyelonephritis with possibility of developing abscess although clinically appears non toxic and has no significant  flank tenderness  . Based on her presentation she may be bacteremic . So far all cs negative . ?? Viral infection with bronchitis     Doubt her allergy to PCN, she has so far tolerated Meropenem well .     She continues to have fevers and cough, probably has bronchiectasis or recurrent aspiration . Vancomycin added .Seen by pulmonary     Plan :   - will continue with Meropenem and Vanco    - agree with possible bronchoscopy   - trend cbc / bmp   - follow cs results   - urology evaluation noted   - increase activity     Continue with present regime .  Appropriate use of antibiotics and adverse effects reviewed.    I have discussed the above plan of care with patient's family in detail. They expressed understanding of the treatment plan . Risks, benefits and alternatives discussed in detail. I have asked if they have any questions or concerns and appropriately addressed them to the best of my ability .      > 35 minutes spent in direct patient care reviewing  the notes, lab data/ imaging , discussion with multidisciplinary team. All questions were addressed and answered to the best of my capacity .    Thank you for allowing me to participate in the care of your patient .        Kayley Blanco MD  [phone]

## 2019-02-17 NOTE — PHYSICAL THERAPY INITIAL EVALUATION ADULT - PERTINENT HX OF CURRENT PROBLEM, REHAB EVAL
Pt admitted due to fever, shaking abd R sided flank and abdominal pain. CT of pelvis showed R sided pyonephritis, CT of chest revealed bilat. bronchopneumonia

## 2019-02-18 LAB
ANION GAP SERPL CALC-SCNC: 7 MMOL/L — SIGNIFICANT CHANGE UP (ref 5–17)
BUN SERPL-MCNC: 11 MG/DL — SIGNIFICANT CHANGE UP (ref 7–23)
CALCIUM SERPL-MCNC: 8.2 MG/DL — LOW (ref 8.5–10.1)
CHLORIDE SERPL-SCNC: 107 MMOL/L — SIGNIFICANT CHANGE UP (ref 96–108)
CO2 SERPL-SCNC: 30 MMOL/L — SIGNIFICANT CHANGE UP (ref 22–31)
CREAT SERPL-MCNC: 0.46 MG/DL — LOW (ref 0.5–1.3)
GLUCOSE SERPL-MCNC: 95 MG/DL — SIGNIFICANT CHANGE UP (ref 70–99)
HCT VFR BLD CALC: 30.1 % — LOW (ref 34.5–45)
HGB BLD-MCNC: 10.4 G/DL — LOW (ref 11.5–15.5)
MAGNESIUM SERPL-MCNC: 2.1 MG/DL — SIGNIFICANT CHANGE UP (ref 1.6–2.6)
MCHC RBC-ENTMCNC: 28.8 PG — SIGNIFICANT CHANGE UP (ref 27–34)
MCHC RBC-ENTMCNC: 34.6 GM/DL — SIGNIFICANT CHANGE UP (ref 32–36)
MCV RBC AUTO: 83.4 FL — SIGNIFICANT CHANGE UP (ref 80–100)
NRBC # BLD: 0 /100 WBCS — SIGNIFICANT CHANGE UP (ref 0–0)
PLATELET # BLD AUTO: 260 K/UL — SIGNIFICANT CHANGE UP (ref 150–400)
POTASSIUM SERPL-MCNC: 3.5 MMOL/L — SIGNIFICANT CHANGE UP (ref 3.5–5.3)
POTASSIUM SERPL-SCNC: 3.5 MMOL/L — SIGNIFICANT CHANGE UP (ref 3.5–5.3)
RBC # BLD: 3.61 M/UL — LOW (ref 3.8–5.2)
RBC # FLD: 13.8 % — SIGNIFICANT CHANGE UP (ref 10.3–14.5)
SODIUM SERPL-SCNC: 144 MMOL/L — SIGNIFICANT CHANGE UP (ref 135–145)
VANCOMYCIN TROUGH SERPL-MCNC: 11.4 UG/ML — SIGNIFICANT CHANGE UP (ref 10–20)
WBC # BLD: 5.23 K/UL — SIGNIFICANT CHANGE UP (ref 3.8–10.5)
WBC # FLD AUTO: 5.23 K/UL — SIGNIFICANT CHANGE UP (ref 3.8–10.5)

## 2019-02-18 PROCEDURE — 99222 1ST HOSP IP/OBS MODERATE 55: CPT

## 2019-02-18 RX ORDER — ENOXAPARIN SODIUM 100 MG/ML
30 INJECTION SUBCUTANEOUS ONCE
Qty: 0 | Refills: 0 | Status: COMPLETED | OUTPATIENT
Start: 2019-02-18 | End: 2019-02-18

## 2019-02-18 RX ORDER — ENOXAPARIN SODIUM 100 MG/ML
70 INJECTION SUBCUTANEOUS EVERY 12 HOURS
Qty: 0 | Refills: 0 | Status: DISCONTINUED | OUTPATIENT
Start: 2019-02-19 | End: 2019-02-20

## 2019-02-18 RX ADMIN — Medication 81 MILLIGRAM(S): at 12:17

## 2019-02-18 RX ADMIN — ENOXAPARIN SODIUM 30 MILLIGRAM(S): 100 INJECTION SUBCUTANEOUS at 20:34

## 2019-02-18 RX ADMIN — ATENOLOL 25 MILLIGRAM(S): 25 TABLET ORAL at 05:18

## 2019-02-18 RX ADMIN — ENOXAPARIN SODIUM 40 MILLIGRAM(S): 100 INJECTION SUBCUTANEOUS at 12:17

## 2019-02-18 RX ADMIN — ALBUTEROL 2.5 MILLIGRAM(S): 90 AEROSOL, METERED ORAL at 07:48

## 2019-02-18 RX ADMIN — Medication 250 MILLIGRAM(S): at 07:02

## 2019-02-18 RX ADMIN — Medication 250 MILLIGRAM(S): at 19:14

## 2019-02-18 RX ADMIN — MEROPENEM 100 MILLIGRAM(S): 1 INJECTION INTRAVENOUS at 17:39

## 2019-02-18 RX ADMIN — MEROPENEM 100 MILLIGRAM(S): 1 INJECTION INTRAVENOUS at 10:00

## 2019-02-18 RX ADMIN — ATENOLOL 25 MILLIGRAM(S): 25 TABLET ORAL at 17:37

## 2019-02-18 RX ADMIN — ALBUTEROL 2.5 MILLIGRAM(S): 90 AEROSOL, METERED ORAL at 19:12

## 2019-02-18 NOTE — CONSULT NOTE ADULT - SUBJECTIVE AND OBJECTIVE BOX
[facility]         Tanika Suero, Porter, Alejandra, Evans, Rickie, Delbert        [phone] (office)    CHIEF COMPLAINT: Patient is a 78y old  Female who presents with a chief complaint of fever, abdominal pain (18 Feb 2019 15:32)      HPI:  77 y/o woman with PMH of HTN and af, diagnosed as an incidental finding about 8 months ago.  she had a raymundo-dccv and had what was most likely several months of eliquis, eventually discontinued.  It is not clear that she ever had any sxs from the af.    She was admitted to the hospital 2/14 with fever and abd pain, and was felt to have pyelo.  She was noted to have af on admission, rate controlled. She remained febrile and so had a ct of the chest, which revealed bilateral bronchopneumonia, with bee as a suspicion.  She is being considered for bronchoscopy.  Noting the af she has been placed recently on remote tele, which reveals af, thus far rate controlled.    Consultation is now being obtained.    She denies any baseline sxs suggestive of angina, heart failure or arrhythmia.     PAST MEDICAL & SURGICAL HISTORY:  Hypertension  No significant past surgical history      SOCIAL HISTORY: No active tobacco, alcohol or illicit drug use    FAMILY HISTORY:   No pertinent family history of CAD       MEDICATIONS  (STANDING):  ALBUTerol    0.083% 2.5 milliGRAM(s) Nebulizer every 12 hours  amLODIPine   Tablet 5 milliGRAM(s) Oral daily  aspirin enteric coated 81 milliGRAM(s) Oral daily  ATENolol  Tablet 25 milliGRAM(s) Oral every 12 hours  enoxaparin Injectable 40 milliGRAM(s) SubCutaneous daily  meropenem  IVPB 1000 milliGRAM(s) IV Intermittent every 8 hours  vancomycin  IVPB 1000 milliGRAM(s) IV Intermittent every 12 hours    MEDICATIONS  (PRN):  acetaminophen   Tablet .. 650 milliGRAM(s) Oral every 6 hours PRN Temp greater or equal to 38C (100.4F), Mild Pain (1 - 3)      Allergies    penicillin (Unknown)    Intolerances        REVIEW OF SYSTEMS: Is negative for eye, ENT, GI, , allergic, dermatologic, musculoskeletal and neurologic, except as described above.    VITAL SIGNS:   Vital Signs Last 24 Hrs  T(C): 37.2 (18 Feb 2019 07:30), Max: 37.4 (17 Feb 2019 16:07)  T(F): 99 (18 Feb 2019 07:30), Max: 99.4 (17 Feb 2019 16:07)  HR: 92 (18 Feb 2019 07:50) (86 - 103)  BP: 111/70 (18 Feb 2019 07:30) (111/70 - 118/78)  BP(mean): --  RR: 17 (18 Feb 2019 07:30) (16 - 19)  SpO2: 95% (18 Feb 2019 07:50) (95% - 98%)    I&O's Summary    17 Feb 2019 07:01  -  18 Feb 2019 07:00  --------------------------------------------------------  IN: 300 mL / OUT: 0 mL / NET: 300 mL        PHYSICAL EXAM:    Constitutional: NAD, awake and alert, well-developed  Eyes:  EOMI, no oral cyanosis, conjunctivae clear, anicteric.  Pulmonary: Non-labored, breath sounds are clear bilaterally, no wheezing, rales or rhonchi  Cardiovascular:  irrregular S1 and S2. No murmur.  No rubs, gallops or clicks  Gastrointestinal: Bowel Sounds present, soft, nontender.   Lymph: No peripheral edema.   Neurological: Alert, strength and sensitivity are grossly intact  Skin: No obvious lesions/rashes.   Psych:  Mood & affect appropriate .    LABS: All Labs Reviewed:                        10.4   5.23  )-----------( 260      ( 18 Feb 2019 06:08 )             30.1                         10.2   6.65  )-----------( 241      ( 17 Feb 2019 08:14 )             29.8                         10.6   9.11  )-----------( 210      ( 16 Feb 2019 05:19 )             30.8     18 Feb 2019 06:08    144    |  107    |  11     ----------------------------<  95     3.5     |  30     |  0.46   17 Feb 2019 08:14    142    |  107    |  7      ----------------------------<  142    3.6     |  29     |  0.40   16 Feb 2019 05:19    146    |  110    |  8      ----------------------------<  120    3.1     |  28     |  0.44     Ca    8.2        18 Feb 2019 06:08  Ca    8.1        17 Feb 2019 08:14  Ca    8.0        16 Feb 2019 05:19  Mg     2.1       18 Feb 2019 06:08  Mg     2.1       17 Feb 2019 08:14  Mg     1.9       16 Feb 2019 05:19            Blood Culture: Organism --  Gram Stain Blood -- Gram Stain --  Specimen Source .Urine Clean Catch (Midstream)  Culture-Blood --    Organism --  Gram Stain Blood -- Gram Stain --  Specimen Source .Blood Blood-Peripheral  Culture-Blood --        02-17 @ 08:14  TSH: 4.78      RADIOLOGY:  < from: CT Chest No Cont (02.16.19 @ 16:35) >    EXAM:  CT CHEST                            PROCEDURE DATE:  02/16/2019          INTERPRETATION:  CLINICAL INFORMATION: Fever, cough rule out pneumonia    COMPARISON: 2011    PROCEDURE:   CT of the Chest was performed without intravenous contrast.  Sagittal and coronal reformats were performed.      FINDINGS:    CHEST:     CHEST WALL AND LOWER NECK: Within normal limits  MEDIASTINUM AND DEVEN: Within normal limits  HEART: Extensive coronary calcifications.  VESSELS: Extensive arterial calcifications.  LUNG AND LARGE AIRWAYS: Bilateral tree-in-bud nodules and mucoid impacted   distal bronchioles most prominent right lower lobe most compatible with   infection, worsened from 2011. No pleural effusion. 8 mm right apical   nodule stable.  VISUALIZED UPPER ABDOMEN: Right posterior lobe hypodensity decrease in   size from 2011 question including hemangioma. 4 mm nonobstructing left   renal calculus.  BONES: Within normal limits.    IMPRESSION: Bilateral bronchopneumonia, including but not limited to BEE,   worsened from 2011.                    CLEO BELTRAN M.D., ATTENDING RADIOLOGIST  This document has been electronically signed. Feb 16 2019  4:43PM                < end of copied text >    EKG: af

## 2019-02-18 NOTE — PROGRESS NOTE ADULT - PROBLEM SELECTOR PLAN 1
hx of Pyelo  on broad spectrum ABX  Ct chest reviewed - doubt pt has Acute PNA  pt has stable resp status - occ cough - suspect poss ROBERTO infection - likely chronic - may have bronchiectasis and may need Non - Emergent bronchoscopy  for now cont NEBS bid and ABX   ID following  will discuss with family - issues of poss Bronchoscopy - on this admission vs outpatient

## 2019-02-18 NOTE — PROGRESS NOTE ADULT - SUBJECTIVE AND OBJECTIVE BOX
INTERVAL HPI/OVERNIGHT EVENTS: No flank pain or chills overnight.    MEDICATIONS  (STANDING):  ALBUTerol    0.083% 2.5 milliGRAM(s) Nebulizer every 12 hours  amLODIPine   Tablet 5 milliGRAM(s) Oral daily  aspirin enteric coated 81 milliGRAM(s) Oral daily  ATENolol  Tablet 25 milliGRAM(s) Oral every 12 hours  enoxaparin Injectable 40 milliGRAM(s) SubCutaneous daily  meropenem  IVPB 1000 milliGRAM(s) IV Intermittent every 8 hours  vancomycin  IVPB 1000 milliGRAM(s) IV Intermittent every 12 hours    MEDICATIONS  (PRN):  acetaminophen   Tablet .. 650 milliGRAM(s) Oral every 6 hours PRN Temp greater or equal to 38C (100.4F), Mild Pain (1 - 3)        Vital Signs Last 24 Hrs  T(C): 37.2 (18 Feb 2019 05:05), Max: 37.4 (17 Feb 2019 16:07)  T(F): 99 (18 Feb 2019 05:05), Max: 99.4 (17 Feb 2019 16:07)  HR: 92 (18 Feb 2019 07:50) (88 - 103)  BP: 112/77 (18 Feb 2019 05:05) (112/77 - 128/77)  BP(mean): --  RR: 16 (18 Feb 2019 05:05) (16 - 19)  SpO2: 95% (18 Feb 2019 07:50) (95% - 98%)    PHYSICAL EXAM:    ABDOMEN: Soft, mild right CVA tenderness  GENITALIA: No SP tenderness    LABS:                        10.4   5.23  )-----------( 260      ( 18 Feb 2019 06:08 )             30.1     02-18    144  |  107  |  11  ----------------------------<  95  3.5   |  30  |  0.46<L>    Ca    8.2<L>      18 Feb 2019 06:08  Mg     2.1     02-18          Urine culture:  02-14 @ 11:07 --   <10,000 CFU/ml Normal Urogenital jessee present  Urine culture:  02-14 @ 09:23 --   No growth to date.      RADIOLOGY & ADDITIONAL TESTS:

## 2019-02-18 NOTE — PROGRESS NOTE ADULT - SUBJECTIVE AND OBJECTIVE BOX
ID Progress note     Name: JONATHAN HERNANDEZ  Age: 78y  Gender: Female  MRN: [id number]    Interval History-- Events noted, low grade fever , no other complaints   Notes reviewed    Past Medical History--  Hypertension  No significant past surgical history      For details regarding the patient's social history, family history, and other miscellaneous elements, please refer the initial infectious diseases consultation and/or the admitting history and physical examination for this admission.    Allergies--  Allergies    penicillin (Unknown)    Intolerances        Medications--  Antibiotics:  meropenem  IVPB 1000 milliGRAM(s) IV Intermittent every 8 hours  vancomycin  IVPB 1000 milliGRAM(s) IV Intermittent every 12 hours    Immunologic:    Other:  acetaminophen   Tablet .. PRN  ALBUTerol    0.083%  amLODIPine   Tablet  aspirin enteric coated  ATENolol  Tablet  enoxaparin Injectable      Review of Systems--  Review of systems unable to be obtained secondary to clinical condition.    Physical Examination--    Vital Signs: T(F): 99 (02-18-19 @ 07:30), Max: 99.4 (02-17-19 @ 16:07)  HR: 92 (02-18-19 @ 07:50)  BP: 111/70 (02-18-19 @ 07:30)  RR: 17 (02-18-19 @ 07:30)  SpO2: 95% (02-18-19 @ 07:50)  Wt(kg): --    General: Nontoxic-appearing Female in no acute distress.  HEENT: AT/NC. PERRL. EOMI. Anicteric. Conjunctiva pink and moist. Oropharynx clear. Dentition fair.  Neck: Not rigid. No sense of mass.  Nodes: None palpable.  Lungs: Coarse breath sounds bilaterally without rales, wheezing or rhonchi  Heart: Regular rate and rhythm. No Murmur. No rub. No gallop. No palpable thrill.  Abdomen: Bowel sounds present and normoactive. Soft. Nondistended. Nontender. No sense of mass. No organomegaly.  Back: No spinal tenderness. No costovertebral angle tenderness.   Extremities: No cyanosis or clubbing. No edema.   Skin: Warm. Dry. Good turgor. No rash. No vasculitic stigmata.  Psychiatric: Appropriate affect and mood for situation.     Laboratory Studies--  CBC                        10.4   5.23  )-----------( 260      ( 18 Feb 2019 06:08 )             30.1       Chemistries  02-18    144  |  107  |  11  ----------------------------<  95  3.5   |  30  |  0.46<L>    Ca    8.2<L>      18 Feb 2019 06:08  Mg     2.1     02-18        Culture Data    Culture - Urine (collected 14 Feb 2019 11:07)  Source: .Urine Clean Catch (Midstream)  Final Report (15 Feb 2019 07:53):    <10,000 CFU/ml Normal Urogenital jessee present    Culture - Blood (collected 14 Feb 2019 09:23)  Source: .Blood Blood-Peripheral  Preliminary Report (15 Feb 2019 10:00):    No growth to date.    Culture - Blood (collected 14 Feb 2019 09:23)  Source: .Blood Blood-Peripheral  Preliminary Report (15 Feb 2019 10:00):    No growth to date.      Radiology:  CT Chest No Cont (02.16.19 @ 16:35) >  CHEST:     CHEST WALL AND LOWER NECK: Within normal limits  MEDIASTINUM AND DEVEN: Within normal limits  HEART: Extensive coronary calcifications.  VESSELS: Extensive arterial calcifications.  LUNG AND LARGE AIRWAYS: Bilateral tree-in-bud nodules and mucoid impacted   distal bronchioles most prominent right lower lobe most compatible with   infection, worsened from 2011. No pleural effusion. 8 mm right apical   nodule stable.  VISUALIZED UPPER ABDOMEN: Right posterior lobe hypodensity decrease in   size from 2011 question including hemangioma. 4 mm nonobstructing left   renal calculus.  BONES: Within normal limits.    IMPRESSION: Bilateral bronchopneumonia, including but not limited to ROBERTO,   worsened from 2011.      CT Abdomen and Pelvis w/ Oral Cont and w/ IV Cont (02.14.19 @ 04:58) >  IMPRESSION:    Heterogeneously enhancing edematous right kidney with focal   low-attenuation opacity in the upper pole posteriorly. Slight prominence   of the right renal pelvis with peripheral wall enhancement. Findings are   likely in keeping with ascending tract infection or pyelonephritis.   Possibility of acute focal bacterial nephritis or developing intrarenal   abscess considered. Mild right perinephric stranding/trace fluid.   Scattered punctate nonobstructive bilateral renal calculi, more   pronounced on the right side.    Incompletely characterized left inferior pole renal hypodensity measuring   up to 1.6 cm. Additional too small to characterize bilateral renal   hypo densities. Consider nonemergent correlation with renal ultrasound for   better characterization.     Appendix is not visualized without secondary signs of acute appendicitis.    Additional findings as mentioned above.      Assessment :     77 y/o woman hx of HTN and with remote hx of kidney stones admitted with 1 day hx of severe chills at home with fever and right sided flank pain , found to have acute right sided pyelonephritis with possibility of developing abscess although clinically appears non toxic and has no significant  flank tenderness  . Based on her presentation she may be bacteremic . So far all cs negative . ?? Viral infection with bronchitis     Doubt her allergy to PCN, she has so far tolerated Meropenem well .     She continues to have fevers and cough, probably has bronchiectasis or recurrent aspiration . Vancomycin added .Seen by pulmonary     Plan :   - will continue with Meropenem and Vanco    - agree with possible bronchoscopy   - trend cbc / bmp   - follow cs results   - urology evaluation noted   - increase activity     Continue with present regime .  Appropriate use of antibiotics and adverse effects reviewed.    I have discussed the above plan of care with patient/family in detail. They expressed understanding of the treatment plan . Risks, benefits and alternatives discussed in detail. I have asked if they have any questions or concerns and appropriately addressed them to the best of my ability .      > 35 minutes spent in direct patient care reviewing  the notes, lab data/ imaging , discussion with multidisciplinary team. All questions were addressed and answered to the best of my capacity .    Thank you for allowing me to participate in the care of your patient .        Kayley Blanco MD  [phone]

## 2019-02-18 NOTE — CHART NOTE - NSCHARTNOTEFT_GEN_A_CORE
Reviewed initial EKG -- patient is back in atrial fibrillation  Will repeat EKG.   Place on remote telemetry.  Check ECHO in AM  Patient has been rate controlled all along and asymptomatic.   Cardio evaluation called  Non-urgent bronchoscopy on hold  Further work-up/management pending clinical course. Reviewed initial EKG -- patient is back in atrial fibrillation  Will repeat EKG.   Place on remote telemetry.  Check ECHO in AM  Patient is rate controlled and asymptomatic.   Cardio evaluation called  Non-urgent bronchoscopy on hold  Further work-up/management pending clinical course.

## 2019-02-18 NOTE — PROGRESS NOTE ADULT - SUBJECTIVE AND OBJECTIVE BOX
Date/Time Patient Seen:  		  Referring MD:   Data Reviewed	       Patient is a 78y old  Female who presents with a chief complaint of fever, abdominal pain (17 Feb 2019 11:29)      Subjective/HPI     PAST MEDICAL & SURGICAL HISTORY:  Hypertension  No significant past surgical history        Medication list         MEDICATIONS  (STANDING):  ALBUTerol    0.083% 2.5 milliGRAM(s) Nebulizer every 12 hours  amLODIPine   Tablet 5 milliGRAM(s) Oral daily  aspirin enteric coated 81 milliGRAM(s) Oral daily  ATENolol  Tablet 25 milliGRAM(s) Oral every 12 hours  enoxaparin Injectable 40 milliGRAM(s) SubCutaneous daily  meropenem  IVPB 1000 milliGRAM(s) IV Intermittent every 8 hours  vancomycin  IVPB 1000 milliGRAM(s) IV Intermittent every 12 hours    MEDICATIONS  (PRN):  acetaminophen   Tablet .. 650 milliGRAM(s) Oral every 6 hours PRN Temp greater or equal to 38C (100.4F), Mild Pain (1 - 3)         Vitals log        ICU Vital Signs Last 24 Hrs  T(C): 37.2 (18 Feb 2019 05:05), Max: 37.4 (17 Feb 2019 16:07)  T(F): 99 (18 Feb 2019 05:05), Max: 99.4 (17 Feb 2019 16:07)  HR: 96 (18 Feb 2019 05:05) (88 - 103)  BP: 112/77 (18 Feb 2019 05:05) (112/77 - 128/77)  BP(mean): --  ABP: --  ABP(mean): --  RR: 16 (18 Feb 2019 05:05) (16 - 19)  SpO2: 95% (18 Feb 2019 05:05) (95% - 98%)           Input and Output:  I&O's Detail    17 Feb 2019 07:01  -  18 Feb 2019 07:00  --------------------------------------------------------  IN:    Solution: 250 mL    Solution: 50 mL  Total IN: 300 mL    OUT:  Total OUT: 0 mL    Total NET: 300 mL          Lab Data                        10.4   5.23  )-----------( 260      ( 18 Feb 2019 06:08 )             30.1     02-18    144  |  107  |  11  ----------------------------<  95  3.5   |  30  |  0.46<L>    Ca    8.2<L>      18 Feb 2019 06:08  Mg     2.1     02-18              Review of Systems	      Objective     Physical Examination    heart s1s2  lung dec BS  abd soft  cn grossly int  on room air      Pertinent Lab findings & Imaging      Jany:  NO   Adequate UO     I&O's Detail    17 Feb 2019 07:01  -  18 Feb 2019 07:00  --------------------------------------------------------  IN:    Solution: 250 mL    Solution: 50 mL  Total IN: 300 mL    OUT:  Total OUT: 0 mL    Total NET: 300 mL               Discussed with:     Cultures:	        Radiology

## 2019-02-18 NOTE — CONSULT NOTE ADULT - ASSESSMENT
77 y/o woman with PMH of HTN and af, diagnosed as an incidental finding about 8 months ago.  she had a raymundo-dccv and had what was most likely several months of eliquis, eventually discontinued.  It is not clear that she ever had any sxs from the af.    She was admitted to the hospital 2/14 with fever and abd pain, and was felt to have pyelo.  She was noted to have af on admission, rate controlled. She remained febrile and so had a ct of the chest, which revealed bilateral bronchopneumonia, with bee as a suspicion.  She is being considered for bronchoscopy.  Noting the af she has been placed recently on remote tele, which reveals af, thus far rate controlled.    Consultation is now being obtained.    She denies any baseline sxs suggestive of angina, heart failure or arrhythmia.     -there is no evidence of acute ischemia.  -there is no evidence for meaningful  volume overload.    -she is in asymptomatic af  -chads-vasc 4, appropriate for long term ac  -given the lack of sxs a repeat dccv, antiarrhythmic or ablation strategy will not clearly be superior to rate control and ac; for now would use lovenox, to be held prior to planned bronchoscopy  -can resume eliquis 5 bid upon dc  -cont remote tele for now to determine whether an adjustment in meds is needed  -if we can obtain a copy of the outpt echo from the past 12 months, I do not believe we would need to repeat it  -monitor electrolytes, keep k>4, Mg>2  -will follow

## 2019-02-18 NOTE — PROGRESS NOTE ADULT - PROBLEM SELECTOR PLAN 4
Likely secondary to pyelonephritis and pna  All cultures, including RVP, NTD  Continue to monitor  Fever curve trending downward

## 2019-02-18 NOTE — PROGRESS NOTE ADULT - SUBJECTIVE AND OBJECTIVE BOX
Patient is a 78y old  Female who presents with a chief complaint of fever, abdominal pain (18 Feb 2019 08:10)      INTERVAL HPI/OVERNIGHT EVENTS: Patient seen and examined. NAD. No complaints.    Vital Signs Last 24 Hrs  T(C): 37.2 (18 Feb 2019 07:30), Max: 37.4 (17 Feb 2019 16:07)  T(F): 99 (18 Feb 2019 07:30), Max: 99.4 (17 Feb 2019 16:07)  HR: 92 (18 Feb 2019 07:50) (86 - 103)  BP: 111/70 (18 Feb 2019 07:30) (111/70 - 118/78)  BP(mean): --  RR: 17 (18 Feb 2019 07:30) (16 - 19)  SpO2: 95% (18 Feb 2019 07:50) (95% - 98%)    02-18    144  |  107  |  11  ----------------------------<  95  3.5   |  30  |  0.46<L>    Ca    8.2<L>      18 Feb 2019 06:08  Mg     2.1     02-18                            10.4   5.23  )-----------( 260      ( 18 Feb 2019 06:08 )             30.1       CAPILLARY BLOOD GLUCOSE                  acetaminophen   Tablet .. 650 milliGRAM(s) Oral every 6 hours PRN  ALBUTerol    0.083% 2.5 milliGRAM(s) Nebulizer every 12 hours  amLODIPine   Tablet 5 milliGRAM(s) Oral daily  aspirin enteric coated 81 milliGRAM(s) Oral daily  ATENolol  Tablet 25 milliGRAM(s) Oral every 12 hours  enoxaparin Injectable 40 milliGRAM(s) SubCutaneous daily  meropenem  IVPB 1000 milliGRAM(s) IV Intermittent every 8 hours  vancomycin  IVPB 1000 milliGRAM(s) IV Intermittent every 12 hours              REVIEW OF SYSTEMS:  CONSTITUTIONAL: No fever, no weight loss, or no fatigue  NECK: No pain, no stiffness  RESPIRATORY: No cough, no wheezing, no chills, no hemoptysis, No shortness of breath  CARDIOVASCULAR: No chest pain, no palpitations, no dizziness, no leg swelling  GASTROINTESTINAL: No abdominal pain. No nausea, no vomiting, no hematemesis; No diarrhea, no constipation. No melena, no hematochezia.  GENITOURINARY: No dysuria, no frequency, no hematuria, no incontinence  NEUROLOGICAL: No headaches, no loss of strength, no numbness, no tremors  SKIN: No itching, no burning  MUSCULOSKELETAL: No joint pain, no swelling; No muscle, no back, no extremity pain  PSYCHIATRIC: No depression, no mood swings,   HEME/LYMPH: No easy bruising, no bleeding gums  ALLERY AND IMMUNOLOGIC: No hives       Consultant(s) Notes Reviewed:  [X] YES  [ ] NO    PHYSICAL EXAM:  GENERAL: NAD  HEAD:  Atraumatic, Normocephalic  EYES: EOMI, PERRLA, conjunctiva and sclera clear  ENMT: No tonsillar erythema, exudates, or enlargement; Moist mucous membranes  NECK: Supple, No JVD  NERVOUS SYSTEM:  Awake & alert  CHEST/LUNG: Clear to auscultation bilaterally; No rales, rhonchi, wheezing,  HEART: Regular rate and rhythm  ABDOMEN: Soft, Nontender, Nondistended; Bowel sounds present  EXTREMITIES:  No clubbing, cyanosis, or edema  LYMPH: No lymphadenopathy noted  SKIN: No rashes      Advanced care planning discussed with patient/family [X] YES   [ ] NO    Advanced care planning discussed with patient/family. Advanced care planning forms reviewed/discussed/completed. 20 minutes spent.

## 2019-02-19 DIAGNOSIS — I48.2 CHRONIC ATRIAL FIBRILLATION: ICD-10-CM

## 2019-02-19 LAB
ANION GAP SERPL CALC-SCNC: 7 MMOL/L — SIGNIFICANT CHANGE UP (ref 5–17)
BASOPHILS # BLD AUTO: 0.04 K/UL — SIGNIFICANT CHANGE UP (ref 0–0.2)
BASOPHILS NFR BLD AUTO: 0.7 % — SIGNIFICANT CHANGE UP (ref 0–2)
BUN SERPL-MCNC: 10 MG/DL — SIGNIFICANT CHANGE UP (ref 7–23)
CALCIUM SERPL-MCNC: 8.5 MG/DL — SIGNIFICANT CHANGE UP (ref 8.5–10.1)
CHLORIDE SERPL-SCNC: 106 MMOL/L — SIGNIFICANT CHANGE UP (ref 96–108)
CO2 SERPL-SCNC: 32 MMOL/L — HIGH (ref 22–31)
CREAT SERPL-MCNC: 0.53 MG/DL — SIGNIFICANT CHANGE UP (ref 0.5–1.3)
CULTURE RESULTS: SIGNIFICANT CHANGE UP
CULTURE RESULTS: SIGNIFICANT CHANGE UP
EOSINOPHIL # BLD AUTO: 0.12 K/UL — SIGNIFICANT CHANGE UP (ref 0–0.5)
EOSINOPHIL NFR BLD AUTO: 2.2 % — SIGNIFICANT CHANGE UP (ref 0–6)
GLUCOSE SERPL-MCNC: 117 MG/DL — HIGH (ref 70–99)
HCT VFR BLD CALC: 33.2 % — LOW (ref 34.5–45)
HGB BLD-MCNC: 11.1 G/DL — LOW (ref 11.5–15.5)
IMM GRANULOCYTES NFR BLD AUTO: 0.6 % — SIGNIFICANT CHANGE UP (ref 0–1.5)
INR BLD: 1.09 RATIO — SIGNIFICANT CHANGE UP (ref 0.88–1.16)
LYMPHOCYTES # BLD AUTO: 1.91 K/UL — SIGNIFICANT CHANGE UP (ref 1–3.3)
LYMPHOCYTES # BLD AUTO: 35.8 % — SIGNIFICANT CHANGE UP (ref 13–44)
MAGNESIUM SERPL-MCNC: 2.2 MG/DL — SIGNIFICANT CHANGE UP (ref 1.6–2.6)
MCHC RBC-ENTMCNC: 28.2 PG — SIGNIFICANT CHANGE UP (ref 27–34)
MCHC RBC-ENTMCNC: 33.4 GM/DL — SIGNIFICANT CHANGE UP (ref 32–36)
MCV RBC AUTO: 84.5 FL — SIGNIFICANT CHANGE UP (ref 80–100)
MONOCYTES # BLD AUTO: 0.65 K/UL — SIGNIFICANT CHANGE UP (ref 0–0.9)
MONOCYTES NFR BLD AUTO: 12.2 % — SIGNIFICANT CHANGE UP (ref 2–14)
NEUTROPHILS # BLD AUTO: 2.59 K/UL — SIGNIFICANT CHANGE UP (ref 1.8–7.4)
NEUTROPHILS NFR BLD AUTO: 48.5 % — SIGNIFICANT CHANGE UP (ref 43–77)
NRBC # BLD: 0 /100 WBCS — SIGNIFICANT CHANGE UP (ref 0–0)
PLATELET # BLD AUTO: 311 K/UL — SIGNIFICANT CHANGE UP (ref 150–400)
POTASSIUM SERPL-MCNC: 3.9 MMOL/L — SIGNIFICANT CHANGE UP (ref 3.5–5.3)
POTASSIUM SERPL-SCNC: 3.9 MMOL/L — SIGNIFICANT CHANGE UP (ref 3.5–5.3)
PROTHROM AB SERPL-ACNC: 12.5 SEC — SIGNIFICANT CHANGE UP (ref 10–12.9)
RBC # BLD: 3.93 M/UL — SIGNIFICANT CHANGE UP (ref 3.8–5.2)
RBC # FLD: 13.7 % — SIGNIFICANT CHANGE UP (ref 10.3–14.5)
SODIUM SERPL-SCNC: 145 MMOL/L — SIGNIFICANT CHANGE UP (ref 135–145)
SPECIMEN SOURCE: SIGNIFICANT CHANGE UP
SPECIMEN SOURCE: SIGNIFICANT CHANGE UP
WBC # BLD: 5.34 K/UL — SIGNIFICANT CHANGE UP (ref 3.8–10.5)
WBC # FLD AUTO: 5.34 K/UL — SIGNIFICANT CHANGE UP (ref 3.8–10.5)

## 2019-02-19 PROCEDURE — 99232 SBSQ HOSP IP/OBS MODERATE 35: CPT

## 2019-02-19 PROCEDURE — 93010 ELECTROCARDIOGRAM REPORT: CPT

## 2019-02-19 PROCEDURE — 93306 TTE W/DOPPLER COMPLETE: CPT | Mod: 26

## 2019-02-19 RX ADMIN — Medication 81 MILLIGRAM(S): at 13:05

## 2019-02-19 RX ADMIN — ENOXAPARIN SODIUM 70 MILLIGRAM(S): 100 INJECTION SUBCUTANEOUS at 18:08

## 2019-02-19 RX ADMIN — ALBUTEROL 2.5 MILLIGRAM(S): 90 AEROSOL, METERED ORAL at 19:54

## 2019-02-19 RX ADMIN — Medication 250 MILLIGRAM(S): at 06:20

## 2019-02-19 RX ADMIN — Medication 250 MILLIGRAM(S): at 18:33

## 2019-02-19 RX ADMIN — ATENOLOL 25 MILLIGRAM(S): 25 TABLET ORAL at 05:47

## 2019-02-19 RX ADMIN — ALBUTEROL 2.5 MILLIGRAM(S): 90 AEROSOL, METERED ORAL at 07:24

## 2019-02-19 RX ADMIN — MEROPENEM 100 MILLIGRAM(S): 1 INJECTION INTRAVENOUS at 00:33

## 2019-02-19 RX ADMIN — ATENOLOL 25 MILLIGRAM(S): 25 TABLET ORAL at 18:08

## 2019-02-19 RX ADMIN — MEROPENEM 100 MILLIGRAM(S): 1 INJECTION INTRAVENOUS at 09:00

## 2019-02-19 RX ADMIN — ENOXAPARIN SODIUM 70 MILLIGRAM(S): 100 INJECTION SUBCUTANEOUS at 05:48

## 2019-02-19 RX ADMIN — AMLODIPINE BESYLATE 5 MILLIGRAM(S): 2.5 TABLET ORAL at 05:47

## 2019-02-19 NOTE — PROGRESS NOTE ADULT - PROBLEM SELECTOR PLAN 1
Clinically improved, abiots per ID, will need to f/u our office 2 weeks after d/c. We will f/u here as needed.

## 2019-02-19 NOTE — PROGRESS NOTE ADULT - SUBJECTIVE AND OBJECTIVE BOX
ID progress note    Name: JONATHAN HERNANDEZ  Age: 78y  Gender: Female  MRN: [id number]    Interval History-- Events noted, doing well, ambulating over 100 feet with walker. Scheduled for bronchoscopy in am   Notes reviewed    Past Medical History--  Hypertension  No significant past surgical history      For details regarding the patient's social history, family history, and other miscellaneous elements, please refer the initial infectious diseases consultation and/or the admitting history and physical examination for this admission.    Allergies--  Allergies    penicillin (Unknown)    Intolerances        Medications--  Antibiotics:  meropenem  IVPB 1000 milliGRAM(s) IV Intermittent every 8 hours  vancomycin  IVPB 1000 milliGRAM(s) IV Intermittent every 12 hours    Immunologic:    Other:  acetaminophen   Tablet .. PRN  ALBUTerol    0.083%  amLODIPine   Tablet  aspirin enteric coated  ATENolol  Tablet  enoxaparin Injectable      Review of Systems--  A 10-point review of systems was obtained.     Pertinent positives and negatives--  Constitutional: No fevers. No Chills. No Rigors.   Cardiovascular: No chest pain. No palpitations.  Respiratory: No shortness of breath. No cough.  Gastrointestinal: No nausea or vomiting. No diarrhea or constipation.   Psychiatric: Pleasant. Appropriate affect.    Review of systems otherwise negative except as previously noted.    Physical Examination--  Vital Signs: T(F): 98.6 (02-19-19 @ 16:21), Max: 98.6 (02-19-19 @ 16:21)  HR: 91 (02-19-19 @ 16:21)  BP: 116/79 (02-19-19 @ 16:21)  RR: 17 (02-19-19 @ 16:21)  SpO2: 97% (02-19-19 @ 16:21)  Wt(kg): --  General: Nontoxic-appearing Female in no acute distress.  HEENT: AT/NC. PERRL. EOMI. Anicteric. Conjunctiva pink and moist. Oropharynx clear. Dentition fair.  Neck: Not rigid. No sense of mass.  Nodes: None palpable.  Lungs: Coarse breath sounds  bilaterally without rales, wheezing or rhonchi  Heart: Regular rate and rhythm. No Murmur. No rub. No gallop. No palpable thrill.  Abdomen: Bowel sounds present and normoactive. Soft. Nondistended. Nontender. No sense of mass. No organomegaly.  Back: No spinal tenderness. No costovertebral angle tenderness.   Extremities: No cyanosis or clubbing. No edema. left AC - iv infiltration   Skin: Warm. Dry. Good turgor. No rash. No vasculitic stigmata.  Psychiatric: Appropriate affect and mood for situation.         Laboratory Studies--  CBC                        11.1   5.34  )-----------( 311      ( 19 Feb 2019 06:45 )             33.2       Chemistries  02-19    145  |  106  |  10  ----------------------------<  117<H>  3.9   |  32<H>  |  0.53    Ca    8.5      19 Feb 2019 06:45  Mg     2.2     02-19    Vancomycin Level, Trough -Pre 4th Dose, order if dosed q6/8/12h (02.18.19 @ 06:08)    Vancomycin Level, Trough: 11.4:       Culture Data    Culture - Urine (collected 14 Feb 2019 11:07)  Source: .Urine Clean Catch (Midstream)  Final Report (15 Feb 2019 07:53):    <10,000 CFU/ml Normal Urogenital jessee present    Culture - Blood (collected 14 Feb 2019 09:23)  Source: .Blood Blood-Peripheral  Final Report (19 Feb 2019 10:01):    No growth at 5 days.    Culture - Blood (collected 14 Feb 2019 09:23)  Source: .Blood Blood-Peripheral  Final Report (19 Feb 2019 10:01):    No growth at 5 days.        RADIOLOGY:  Xray Chest 1 View- PORTABLE-Urgent (02.17.19 @ 06:41) >  INTERPRETATION:  History: Pyelonephritis with cough and crackles    Portable semiupright chest x-ray is compared to 2/14/2019.    The heart size is borderline. The trachea is midline. The lungs are   clear. There is osteopenia of the bony structures.    Impression: No active pulmonary disease      CT Chest No Cont (02.16.19 @ 16:35) >  CHEST:     CHEST WALL AND LOWER NECK: Within normal limits  MEDIASTINUM AND DEVEN: Within normal limits  HEART: Extensive coronary calcifications.  VESSELS: Extensive arterial calcifications.  LUNG AND LARGE AIRWAYS: Bilateral tree-in-bud nodules and mucoid impacted   distal bronchioles most prominent right lower lobe most compatible with   infection, worsened from 2011. No pleural effusion. 8 mm right apical   nodule stable.  VISUALIZED UPPER ABDOMEN: Right posterior lobe hypodensity decrease in   size from 2011 question including hemangioma. 4 mm nonobstructing left   renal calculus.  BONES: Within normal limits.    IMPRESSION: Bilateral bronchopneumonia, including but not limited to ROBERTO,   worsened from 2011.      CT Abdomen and Pelvis w/ Oral Cont and w/ IV Cont (02.14.19 @ 04:58) >  IMPRESSION:    Heterogeneously enhancing edematous right kidney with focal   low-attenuation opacity in the upper pole posteriorly. Slight prominence   of the right renal pelvis with peripheral wall enhancement. Findings are   likely in keeping with ascending tract infection or pyelonephritis.   Possibility of acute focal bacterial nephritis or developing intrarenal   abscess considered. Mild right perinephric stranding/trace fluid.   Scattered punctate nonobstructive bilateral renal calculi, more   pronounced on the right side.    Incompletely characterized left inferior pole renal hypodensity measuring   up to 1.6 cm. Additional too small to characterize bilateral renal   hypo densities. Consider nonemergent correlation with renal ultrasound for   better characterization.     Appendix is not visualized without secondary signs of acute appendicitis.    Additional findings as mentioned above.      Assessment :     79 y/o woman hx of HTN and with remote hx of kidney stones admitted with 1 day hx of severe chills at home with fever and right sided flank pain , found to have acute right sided pyelonephritis with possibility of developing abscess although clinically appears non toxic and has no significant  flank tenderness  . Based on her presentation she may be bacteremic . So far all cs negative . ?? Viral infection with bronchitis     Doubt her allergy to PCN, she has so far tolerated Meropenem well .     She continues to have fevers and cough, probably has bronchiectasis or recurrent aspiration . Vancomycin added .Seen by pulmonary     Plan :   - will DC  Meropenem today and Vancomycin after am dose    - for bronchoscopy in am    - trend cbc / bmp   - follow cs results   - urology evaluation noted   - increase activity   - warm compresses to left AC q 4 hours     Continue with present regime .  Appropriate use of antibiotics and adverse effects reviewed.    I have discussed the above plan of care with patient and her family in detail. They expressed understanding of the treatment plan . Risks, benefits and alternatives discussed in detail. I have asked if they have any questions or concerns and appropriately addressed them to the best of my ability .      > 35 minutes spent in direct patient care reviewing  the notes, lab data/ imaging , discussion with multidisciplinary team. All questions were addressed and answered to the best of my capacity .    Thank you for allowing me to participate in the care of your patient .        Kayley Blanco MD  [phone]

## 2019-02-19 NOTE — PROGRESS NOTE ADULT - SUBJECTIVE AND OBJECTIVE BOX
[facility] -- Tanika Suero, Porter, Alejandra, Rickie Krueger Savella  Office # [phone]    Follow Up:  Afib, preop eval    Subjective/Observations: Awake and alert, comfortable on RA.  Denies SOB or orthopnea.  However, c/o productive cough.  c/o heaviness across chest wall not associated with activity or any symptom.  Denies palpitations    REVIEW OF SYSTEMS: All other review of systems is negative unless indicated above    PAST MEDICAL & SURGICAL HISTORY:  Hypertension  No significant past surgical history    MEDICATIONS  (STANDING):  ALBUTerol    0.083% 2.5 milliGRAM(s) Nebulizer every 12 hours  amLODIPine   Tablet 5 milliGRAM(s) Oral daily  aspirin enteric coated 81 milliGRAM(s) Oral daily  ATENolol  Tablet 25 milliGRAM(s) Oral every 12 hours  enoxaparin Injectable 70 milliGRAM(s) SubCutaneous every 12 hours  meropenem  IVPB 1000 milliGRAM(s) IV Intermittent every 8 hours  vancomycin  IVPB 1000 milliGRAM(s) IV Intermittent every 12 hours    MEDICATIONS  (PRN):  acetaminophen   Tablet .. 650 milliGRAM(s) Oral every 6 hours PRN Temp greater or equal to 38C (100.4F), Mild Pain (1 - 3)    Allergies    penicillin (Unknown)    Intolerances    Vital Signs Last 24 Hrs  T(C): 36.4 (19 Feb 2019 05:45), Max: 37.4 (18 Feb 2019 16:31)  T(F): 97.6 (19 Feb 2019 05:45), Max: 99.4 (18 Feb 2019 16:31)  HR: 81 (19 Feb 2019 07:27) (81 - 114)  BP: 126/86 (19 Feb 2019 05:45) (117/71 - 126/86)  BP(mean): --  RR: 16 (19 Feb 2019 05:45) (16 - 18)  SpO2: 95% (19 Feb 2019 07:27) (95% - 98%)    I&O's Summary    18 Feb 2019 07:01  -  19 Feb 2019 07:00  --------------------------------------------------------  IN: 300 mL / OUT: 0 mL / NET: 300 mL    PHYSICAL EXAM:  TELE: Not on tele  Constitutional: NAD, awake and alert, well-developed  HEENT: Moist Mucous Membranes, Anicteric  Pulmonary: Non-labored, breath sounds are diminished bilaterally, No wheezing, rales.  + rhonchi R>L  Cardiovascular: Irregularly irregular, S1 and S2, + murmurs.  No rubs, gallops or clicks  Gastrointestinal: Bowel Sounds present, soft, nontender.   Lymph: No peripheral edema. No lymphadenopathy.  Skin: No visible rashes or ulcers.  Psych:  Mood & affect appropriate    LABS: All Labs Reviewed:                        11.1   5.34  )-----------( 311      ( 19 Feb 2019 06:45 )             33.2                         10.4   5.23  )-----------( 260      ( 18 Feb 2019 06:08 )             30.1                         10.2   6.65  )-----------( 241      ( 17 Feb 2019 08:14 )             29.8     19 Feb 2019 06:45    145    |  106    |  10     ----------------------------<  117    3.9     |  32     |  0.53   18 Feb 2019 06:08    144    |  107    |  11     ----------------------------<  95     3.5     |  30     |  0.46   17 Feb 2019 08:14    142    |  107    |  7      ----------------------------<  142    3.6     |  29     |  0.40     Ca    8.5        19 Feb 2019 06:45  Ca    8.2        18 Feb 2019 06:08  Ca    8.1        17 Feb 2019 08:14  Mg     2.2       19 Feb 2019 06:45  Mg     2.1       18 Feb 2019 06:08  Mg     2.1       17 Feb 2019 08:14    PT/INR - ( 19 Feb 2019 06:45 )   PT: 12.5 sec;   INR: 1.09 ratio      < from: Xray Chest 1 View- PORTABLE-Urgent (02.17.19 @ 06:41) >    EXAM:  XR CHEST PORTABLE URGENT 1V                            PROCEDURE DATE:  02/17/2019      INTERPRETATION:  History: Pyelonephritis with cough and crackles    Portable semiupright chest x-ray is compared to 2/14/2019.    The heart size is borderline. The trachea is midline. The lungs are   clear. There is osteopenia of the bony structures.    Impression: No active pulmonary disease    JOSE OTTO M.D.,ATTENDING RADIOLOGIST  This document has been electronically signed. Feb17 2019  9:21AM      < end of copied text >    < from: CT Chest No Cont (02.16.19 @ 16:35) >    EXAM:  CT CHEST                          PROCEDURE DATE:  02/16/2019      INTERPRETATION:  CLINICAL INFORMATION: Fever, cough rule out pneumonia    COMPARISON: 2011    PROCEDURE:   CT of the Chest was performed without intravenous contrast.  Sagittal and coronal reformats were performed.      FINDINGS:    CHEST:     CHEST WALL AND LOWER NECK: Within normal limits  MEDIASTINUM AND DEVEN: Within normal limits  HEART: Extensive coronary calcifications.  VESSELS: Extensive arterial calcifications.  LUNG AND LARGE AIRWAYS: Bilateral tree-in-bud nodules and mucoid impacted   distal bronchioles most prominent right lower lobe most compatible with   infection, worsened from 2011. No pleural effusion. 8 mm right apical   nodule stable.  VISUALIZED UPPER ABDOMEN: Right posterior lobe hypodensity decrease in   size from 2011 question including hemangioma. 4 mm nonobstructing left   renal calculus.  BONES: Within normal limits.    IMPRESSION: Bilateral bronchopneumonia, including but not limited to ROBERTO,   worsened from 2011.    CLEO BELTRAN M.D., ATTENDING RADIOLOGIST  This document has been electronically signed. Feb 16 2019  4:43PM      < end of copied text >

## 2019-02-19 NOTE — PROGRESS NOTE ADULT - SUBJECTIVE AND OBJECTIVE BOX
Date/Time Patient Seen:  		  Referring MD:   Data Reviewed	       Patient is a 78y old  Female who presents with a chief complaint of fever, abdominal pain (18 Feb 2019 16:04)      Subjective/HPI     PAST MEDICAL & SURGICAL HISTORY:  Hypertension  No significant past surgical history        Medication list         MEDICATIONS  (STANDING):  ALBUTerol    0.083% 2.5 milliGRAM(s) Nebulizer every 12 hours  amLODIPine   Tablet 5 milliGRAM(s) Oral daily  aspirin enteric coated 81 milliGRAM(s) Oral daily  ATENolol  Tablet 25 milliGRAM(s) Oral every 12 hours  enoxaparin Injectable 70 milliGRAM(s) SubCutaneous every 12 hours  meropenem  IVPB 1000 milliGRAM(s) IV Intermittent every 8 hours  vancomycin  IVPB 1000 milliGRAM(s) IV Intermittent every 12 hours    MEDICATIONS  (PRN):  acetaminophen   Tablet .. 650 milliGRAM(s) Oral every 6 hours PRN Temp greater or equal to 38C (100.4F), Mild Pain (1 - 3)         Vitals log        ICU Vital Signs Last 24 Hrs  T(C): 36.4 (19 Feb 2019 05:45), Max: 37.4 (18 Feb 2019 16:31)  T(F): 97.6 (19 Feb 2019 05:45), Max: 99.4 (18 Feb 2019 16:31)  HR: 114 (19 Feb 2019 05:45) (86 - 114)  BP: 126/86 (19 Feb 2019 05:45) (111/70 - 126/86)  BP(mean): --  ABP: --  ABP(mean): --  RR: 16 (19 Feb 2019 05:45) (16 - 18)  SpO2: 96% (19 Feb 2019 05:45) (95% - 98%)           Input and Output:  I&O's Detail    18 Feb 2019 07:01  -  19 Feb 2019 07:00  --------------------------------------------------------  IN:    Solution: 50 mL    Solution: 250 mL  Total IN: 300 mL    OUT:  Total OUT: 0 mL    Total NET: 300 mL          Lab Data                        11.1   5.34  )-----------( 311      ( 19 Feb 2019 06:45 )             33.2     02-19    145  |  106  |  10  ----------------------------<  117<H>  3.9   |  32<H>  |  0.53    Ca    8.5      19 Feb 2019 06:45  Mg     2.2     02-19              Review of Systems	      Objective     Physical Examination    heart s1s2  lung dec BS      Pertinent Lab findings & Imaging      Jany:  NO   Adequate UO     I&O's Detail    18 Feb 2019 07:01  -  19 Feb 2019 07:00  --------------------------------------------------------  IN:    Solution: 50 mL    Solution: 250 mL  Total IN: 300 mL    OUT:  Total OUT: 0 mL    Total NET: 300 mL               Discussed with:     Cultures:	        Radiology

## 2019-02-19 NOTE — PROGRESS NOTE ADULT - PROBLEM SELECTOR PLAN 1
bronchitis  atelectasis  mucus plugging  OP  OA  AF  cardio follow up - tuyet op eval - poss Bronchoscopy in am tomorrow if optimized from Cardio point  discussed with cardio   will follow  cont NEBS  tolerating room air

## 2019-02-19 NOTE — PROGRESS NOTE ADULT - ASSESSMENT
79 y/o woman with PMH of HTN and af, diagnosed as an incidental finding about 8 months ago.  she had a raymundo-dccv and had what was most likely several months of eliquis, eventually discontinued.  It is not clear that she ever had any sxs from the af.    She was admitted to the hospital 2/14 with fever and abd pain, and was felt to have pyelo.  She was noted to have af on admission, rate controlled. She remained febrile and so had a ct of the chest, which revealed bilateral bronchopneumonia, with bee as a suspicion.  She is being considered for bronchoscopy.  Noting the af she has been placed recently on remote tele, which reveals af, thus far rate controlled.    Recommend:    - Continue telemetry monitoring.  On Afib that is mildly tachycardic, rate 100's-110's  - Now c/p chest heaviness.  Will obtain 12 lead EKG and obtain cardiac enzymes to r/o ischemia prior to cardiac clearance for bronc  - No evidence of volume overload  - [id number]= 4, appropriate for long term ac  - Given the lack of sxs a repeat dccv, antiarrhythmic or ablation strategy will not clearly be superior to rate control and ac; for now would use lovenox, to be held prior to planned bronchoscopy  - Please resume eliquis 5 bid upon dc or when cleared by Pulm  - Will attempt to obtain outpatient TTE from her private Cardio, otherwise, will obtain while inpatient  - monitor electrolytes, keep k>4, Mg>2  - will follow    Cristina Baez NP  Cardiology 77 y/o woman with PMH of HTN and af, diagnosed as an incidental finding about 8 months ago.  she had a raymundo-dccv and had what was most likely several months of eliquis, eventually discontinued.  It is not clear that she ever had any sxs from the af.    She was admitted to the hospital 2/14 with fever and abd pain, and was felt to have pyelo.  She was noted to have af on admission, rate controlled. She remained febrile and so had a ct of the chest, which revealed bilateral bronchopneumonia, with bee as a suspicion.  She is being considered for bronchoscopy.  Noting the af she has been placed recently on remote tele, which reveals af, thus far rate controlled.    Recommend:    - Continue telemetry monitoring.  On Afib that is mildly tachycardic, rate 100's-110's  - Now c/p chest heaviness.  Will obtain 12 lead EKG and obtain cardiac enzymes to r/o ischemia prior to cardiac clearance for bronc  - No evidence of volume overload  - [id number]= 4, appropriate for long term ac  - Given the lack of sxs a repeat dccv, antiarrhythmic or ablation strategy will not clearly be superior to rate control and ac; for now would use lovenox, to be held prior to planned bronchoscopy  - Please resume eliquis 5 bid upon dc or when cleared by Pulm  - Continue ASA  - Continue Norvasc  and Atenolol  - Will attempt to obtain outpatient TTE from her private Cardio, otherwise, will obtain while inpatient  - monitor electrolytes, keep k>4, Mg>2  - will follow    Cristina Baez NP  Cardiology 79 y/o woman with PMH of HTN and af, diagnosed as an incidental finding about 8 months ago.  she had a raymundo-dccv and had what was most likely several months of eliquis, eventually discontinued.  It is not clear that she ever had any sxs from the af.    She was admitted to the hospital 2/14 with fever and abd pain, and was felt to have pyelo.  She was noted to have af on admission, rate controlled. She remained febrile and so had a ct of the chest, which revealed bilateral bronchopneumonia, with bee as a suspicion.  She is being considered for bronchoscopy.  Noting the af she has been placed recently on remote tele, which reveals af, thus far rate controlled.    Recommend:    - Continue telemetry monitoring.  On Afib that is mildly tachycardic, rate 100's-110's  - Now c/p chest heaviness.  Will obtain 12 lead EKG and obtain cardiac enzymes to r/o ischemia prior to cardiac clearance for bronc  - No evidence of volume overload  - [id number]= 4, appropriate for long term ac  - Given the lack of sxs a repeat dccv, antiarrhythmic or ablation strategy will not clearly be superior to rate control and ac; for now would use lovenox, to be held prior to planned bronchoscopy  - Please resume eliquis 5 bid upon dc or when cleared by Pulm  - Continue ASA  - Continue Norvasc  and Atenolol  - Will attempt to obtain outpatient TTE from her private Cardio, otherwise, will obtain while inpatient  - monitor electrolytes, keep k>4, Mg>2  - will follow    Addendum:  No evidence of acute ischemic event on 12 lead EKG and cardiac enzymes.  No need to trend CE's.  Patient with no evidence of volume overload, cardiac arrhythmias, and significant valvular disease.  She considered optimized for bronchoscopy in am.    Cristina Baez NP  Cardiology

## 2019-02-19 NOTE — PROGRESS NOTE ADULT - SUBJECTIVE AND OBJECTIVE BOX
INTERVAL HPI/OVERNIGHT EVENTS: No abdominal pain, being eval for PNA    MEDICATIONS  (STANDING):  ALBUTerol    0.083% 2.5 milliGRAM(s) Nebulizer every 12 hours  amLODIPine   Tablet 5 milliGRAM(s) Oral daily  aspirin enteric coated 81 milliGRAM(s) Oral daily  ATENolol  Tablet 25 milliGRAM(s) Oral every 12 hours  enoxaparin Injectable 70 milliGRAM(s) SubCutaneous every 12 hours  meropenem  IVPB 1000 milliGRAM(s) IV Intermittent every 8 hours  vancomycin  IVPB 1000 milliGRAM(s) IV Intermittent every 12 hours    MEDICATIONS  (PRN):  acetaminophen   Tablet .. 650 milliGRAM(s) Oral every 6 hours PRN Temp greater or equal to 38C (100.4F), Mild Pain (1 - 3)        Vital Signs Last 24 Hrs  T(C): 36.4 (19 Feb 2019 05:45), Max: 37.4 (18 Feb 2019 16:31)  T(F): 97.6 (19 Feb 2019 05:45), Max: 99.4 (18 Feb 2019 16:31)  HR: 81 (19 Feb 2019 07:27) (81 - 114)  BP: 126/86 (19 Feb 2019 05:45) (117/71 - 126/86)  BP(mean): --  RR: 16 (19 Feb 2019 05:45) (16 - 18)  SpO2: 95% (19 Feb 2019 07:27) (95% - 98%)    PHYSICAL EXAM:    ABDOMEN: soft, NT      LABS:                        11.1   5.34  )-----------( 311      ( 19 Feb 2019 06:45 )             33.2     02-19    145  |  106  |  10  ----------------------------<  117<H>  3.9   |  32<H>  |  0.53    Ca    8.5      19 Feb 2019 06:45  Mg     2.2     02-19      PT/INR - ( 19 Feb 2019 06:45 )   PT: 12.5 sec;   INR: 1.09 ratio

## 2019-02-20 ENCOUNTER — RESULT REVIEW (OUTPATIENT)
Age: 79
End: 2019-02-20

## 2019-02-20 LAB
ANION GAP SERPL CALC-SCNC: 4 MMOL/L — LOW (ref 5–17)
BUN SERPL-MCNC: 15 MG/DL — SIGNIFICANT CHANGE UP (ref 7–23)
CALCIUM SERPL-MCNC: 8.6 MG/DL — SIGNIFICANT CHANGE UP (ref 8.5–10.1)
CHLORIDE SERPL-SCNC: 108 MMOL/L — SIGNIFICANT CHANGE UP (ref 96–108)
CO2 SERPL-SCNC: 33 MMOL/L — HIGH (ref 22–31)
CREAT SERPL-MCNC: 0.54 MG/DL — SIGNIFICANT CHANGE UP (ref 0.5–1.3)
GLUCOSE SERPL-MCNC: 96 MG/DL — SIGNIFICANT CHANGE UP (ref 70–99)
GRAM STN FLD: SIGNIFICANT CHANGE UP
HCT VFR BLD CALC: 31.7 % — LOW (ref 34.5–45)
HGB BLD-MCNC: 10.8 G/DL — LOW (ref 11.5–15.5)
MAGNESIUM SERPL-MCNC: 2.3 MG/DL — SIGNIFICANT CHANGE UP (ref 1.6–2.6)
MCHC RBC-ENTMCNC: 28.7 PG — SIGNIFICANT CHANGE UP (ref 27–34)
MCHC RBC-ENTMCNC: 34.1 GM/DL — SIGNIFICANT CHANGE UP (ref 32–36)
MCV RBC AUTO: 84.3 FL — SIGNIFICANT CHANGE UP (ref 80–100)
NRBC # BLD: 0 /100 WBCS — SIGNIFICANT CHANGE UP (ref 0–0)
PLATELET # BLD AUTO: 323 K/UL — SIGNIFICANT CHANGE UP (ref 150–400)
POTASSIUM SERPL-MCNC: 4 MMOL/L — SIGNIFICANT CHANGE UP (ref 3.5–5.3)
POTASSIUM SERPL-SCNC: 4 MMOL/L — SIGNIFICANT CHANGE UP (ref 3.5–5.3)
RBC # BLD: 3.76 M/UL — LOW (ref 3.8–5.2)
RBC # FLD: 13.7 % — SIGNIFICANT CHANGE UP (ref 10.3–14.5)
SODIUM SERPL-SCNC: 145 MMOL/L — SIGNIFICANT CHANGE UP (ref 135–145)
SPECIMEN SOURCE: SIGNIFICANT CHANGE UP
WBC # BLD: 5.7 K/UL — SIGNIFICANT CHANGE UP (ref 3.8–10.5)
WBC # FLD AUTO: 5.7 K/UL — SIGNIFICANT CHANGE UP (ref 3.8–10.5)

## 2019-02-20 PROCEDURE — 88108 CYTOPATH CONCENTRATE TECH: CPT | Mod: 26

## 2019-02-20 PROCEDURE — 99232 SBSQ HOSP IP/OBS MODERATE 35: CPT

## 2019-02-20 PROCEDURE — 88305 TISSUE EXAM BY PATHOLOGIST: CPT | Mod: 26

## 2019-02-20 RX ORDER — APIXABAN 2.5 MG/1
5 TABLET, FILM COATED ORAL EVERY 12 HOURS
Qty: 0 | Refills: 0 | Status: DISCONTINUED | OUTPATIENT
Start: 2019-02-20 | End: 2019-02-21

## 2019-02-20 RX ADMIN — APIXABAN 5 MILLIGRAM(S): 2.5 TABLET, FILM COATED ORAL at 18:37

## 2019-02-20 RX ADMIN — AMLODIPINE BESYLATE 5 MILLIGRAM(S): 2.5 TABLET ORAL at 06:41

## 2019-02-20 RX ADMIN — Medication 81 MILLIGRAM(S): at 17:30

## 2019-02-20 RX ADMIN — Medication 250 MILLIGRAM(S): at 06:42

## 2019-02-20 RX ADMIN — ATENOLOL 25 MILLIGRAM(S): 25 TABLET ORAL at 17:30

## 2019-02-20 RX ADMIN — ALBUTEROL 2.5 MILLIGRAM(S): 90 AEROSOL, METERED ORAL at 07:15

## 2019-02-20 RX ADMIN — ATENOLOL 25 MILLIGRAM(S): 25 TABLET ORAL at 06:42

## 2019-02-20 NOTE — PROGRESS NOTE ADULT - PROBLEM SELECTOR PLAN 1
Possible PNA +/- bronchiectasis  Off abx  S/P bronch  Albuterol q12h  Pulmonary consult noted Possible PNA +/- bronchiectasis  Off abx  S/P bronch-- a lot of mucous plugging seen, no endobronchial lesions  Albuterol q12h  Pulmonary consult noted

## 2019-02-20 NOTE — PROGRESS NOTE ADULT - PROBLEM SELECTOR PLAN 1
mucus plugging, atelectasis, bronchitis, OP and OA  AF  will plan on Bronchoscopy today  ID follow up noted  NEBS  cvs regimen  BP control  monitor vs and HD and Sat  will follow

## 2019-02-20 NOTE — PROGRESS NOTE ADULT - SUBJECTIVE AND OBJECTIVE BOX
[facility] -- Tanika Suero, Porter, Alejandra, Rickie Krueger Savella  Office # [phone]    Follow Up:  Afib    Subjective/Observations: Awake and alert, remains on RA.  S/P bronch today.  Ambulating to the bathroom with minimal assistance.  No GROVES.  Denies CP or palpitation    REVIEW OF SYSTEMS: All other review of systems is negative unless indicated above    PAST MEDICAL & SURGICAL HISTORY:  Hypertension  No significant past surgical history    MEDICATIONS  (STANDING):  ALBUTerol    0.083% 2.5 milliGRAM(s) Nebulizer every 12 hours  amLODIPine   Tablet 5 milliGRAM(s) Oral daily  aspirin enteric coated 81 milliGRAM(s) Oral daily  ATENolol  Tablet 25 milliGRAM(s) Oral every 12 hours  enoxaparin Injectable 70 milliGRAM(s) SubCutaneous every 12 hours    MEDICATIONS  (PRN):  acetaminophen   Tablet .. 650 milliGRAM(s) Oral every 6 hours PRN Temp greater or equal to 38C (100.4F), Mild Pain (1 - 3)    Allergies    penicillin (Unknown)    Intolerances    Vital Signs Last 24 Hrs  T(C): 37.1 (20 Feb 2019 13:50), Max: 37.1 (20 Feb 2019 13:50)  T(F): 98.7 (20 Feb 2019 13:50), Max: 98.7 (20 Feb 2019 13:50)  HR: 87 (20 Feb 2019 13:50) (84 - 101)  BP: 105/69 (20 Feb 2019 13:50) (102/65 - 125/74)  BP(mean): --  RR: 16 (20 Feb 2019 13:50) (14 - 17)  SpO2: 93% (20 Feb 2019 13:50) (93% - 100%)    I&O's Summary    20 Feb 2019 07:01  -  20 Feb 2019 14:21  --------------------------------------------------------  IN: 350 mL / OUT: 0 mL / NET: 350 mL    PHYSICAL EXAM:  TELE: Not on tele  Constitutional: NAD, awake and alert, well-developed  HEENT: Moist Mucous Membranes, Anicteric  Pulmonary: Non-labored, breath sounds are diminished bilaterally, No wheezing, rales.    Cardiovascular: Irregularly irregular, S1 and S2, + murmurs.  No rubs, gallops or clicks  Gastrointestinal: Bowel Sounds present, soft, nontender.   Lymph: No peripheral edema. No lymphadenopathy.  Skin: No visible rashes or ulcers.  Psych:  Mood & affect appropriate    LABS: All Labs Reviewed:                        10.8   5.70  )-----------( 323      ( 20 Feb 2019 06:25 )             31.7                         11.1   5.34  )-----------( 311      ( 19 Feb 2019 06:45 )             33.2                         10.4   5.23  )-----------( 260      ( 18 Feb 2019 06:08 )             30.1     20 Feb 2019 06:25    145    |  108    |  15     ----------------------------<  96     4.0     |  33     |  0.54   19 Feb 2019 06:45    145    |  106    |  10     ----------------------------<  117    3.9     |  32     |  0.53   18 Feb 2019 06:08    144    |  107    |  11     ----------------------------<  95     3.5     |  30     |  0.46     Ca    8.6        20 Feb 2019 06:25  Ca    8.5        19 Feb 2019 06:45  Ca    8.2        18 Feb 2019 06:08  Mg     2.3       20 Feb 2019 06:25  Mg     2.2       19 Feb 2019 06:45  Mg     2.1       18 Feb 2019 06:08  PT/INR - ( 19 Feb 2019 06:45 )   PT: 12.5 sec;   INR: 1.09 ratio    CARDIAC MARKERS ( 19 Feb 2019 06:45 )  <.015 ng/mL / x     / 14 U/L / x     / <1.0 ng/mL    < from: Xray Chest 1 View- PORTABLE-Urgent (02.17.19 @ 06:41) >    EXAM:  XR CHEST PORTABLE URGENT 1V                            PROCEDURE DATE:  02/17/2019      INTERPRETATION:  History: Pyelonephritis with cough and crackles    Portable semiupright chest x-ray is compared to 2/14/2019.    The heart size is borderline. The trachea is midline. The lungs are   clear. There is osteopenia of the bony structures.    Impression: No active pulmonary disease    JOSE OTTO M.D.,ATTENDING RADIOLOGIST  This document has been electronically signed. Feb17 2019  9:21AM      < end of copied text >    < from: CT Chest No Cont (02.16.19 @ 16:35) >    EXAM:  CT CHEST                          PROCEDURE DATE:  02/16/2019      INTERPRETATION:  CLINICAL INFORMATION: Fever, cough rule out pneumonia    COMPARISON: 2011    PROCEDURE:   CT of the Chest was performed without intravenous contrast.  Sagittal and coronal reformats were performed.      FINDINGS:    CHEST:     CHEST WALL AND LOWER NECK: Within normal limits  MEDIASTINUM AND DEVEN: Within normal limits  HEART: Extensive coronary calcifications.  VESSELS: Extensive arterial calcifications.  LUNG AND LARGE AIRWAYS: Bilateral tree-in-bud nodules and mucoid impacted   distal bronchioles most prominent right lower lobe most compatible with   infection, worsened from 2011. No pleural effusion. 8 mm right apical   nodule stable.  VISUALIZED UPPER ABDOMEN: Right posterior lobe hypodensity decrease in   size from 2011 question including hemangioma. 4 mm nonobstructing left   renal calculus.  BONES: Within normal limits.    IMPRESSION: Bilateral bronchopneumonia, including but not limited to ROBERTO,   worsened from 2011.    CLEO BELTRAN M.D., ATTENDING RADIOLOGIST  This document has been electronically signed. Feb 16 2019  4:43PM      < end of copied text >    < from: TTE Echo Doppler w/o Cont (02.19.19 @ 12:05) >     EXAM:  ECHO TTE WO CON COMP W DOPPLR         PROCEDURE DATE:  02/19/2019        INTERPRETATION:  Ordering Physician: TENISHA BYERS [id number]    Indication: Abnormal EKG    Study Quality: Technically difficult   A complete echocardiographic studywas performed utilizing standard   protocol including spectral and color Doppler in all echocardiographic   windows.    Height: 170 cm  Weight: 72 kg  BSA: 1.84 sq m  Blood Pressure: 103/70    MEASUREMENTS  IVS: 1.2cm  PWT: 1.1cm  LA: 3.9cm  AO: 3.6cm  LVIDd: 4.9cm  LVIDs: 3.5cm  LVOTd: 2.1cm    LVEF: 55-60%  RVSP: 30mmHg    FINDINGS  Left Ventricle: The endocardium is not well-visualized. Grossly normal   left ventricular systolic function.  Aortic Valve: Calcified aortic valve with decreased opening. The aortic   valve gradient is equal to 16 mmHg, and the mean aortic valve gradient is   equal to 10 mmHg, consistent with mild aortic stenosis. Mild to moderate   aortic insufficiency  Mitral Valve Thickened mitral valve leaflets with moderatemitral   regurgitation  Tricuspid Valve: Grossly normal tricuspid valve. Mild tricuspid   regurgitation  Pulmonic Valve: Not well-visualized. Trace pulmonic insufficiency.  Left Atrium: Moderate left atrial enlargement  Right Ventricle: Grossly normal RV size and systolic function   Right Atrium: Right atrial enlargement  Diastolic Function: Indeterminant diastolic function  Pericardium/Pleura: No pericardial effusion  Hemodynamics: The pulmonary artery systolic pressure is estimated to be   30 mmHg, assuming the right atrial pressure is equal to 8 mmHg,   consistent with normal pulmonary pressures.    CONCLUSIONS:  1. Technically difficult study  2. The endocardium is not well-visualized. Grossly normal left   ventricular systolic function.  3. Calcified aortic valve with decreased opening. Mild aortic stenosis.   Mild to moderate aortic insufficiency.  4. Thickened mitral valve leaflets with moderate mitral regurgitation  5. Biatrial enlargement  6. Normal pulmonary pressures  7. No pericardial effusion    BONITA ROONEY   This document has been electronically signed. Feb 20 2019  6:17AM     < end of copied text >

## 2019-02-20 NOTE — PROGRESS NOTE ADULT - SUBJECTIVE AND OBJECTIVE BOX
ID Progress note     Name: JONATHAN HERNANDEZ  Age: 78y  Gender: Female  MRN: [id number]    Interval History-- Events noted, doing better , afebrile . had bronchoscopy today noted to have thick tenacious secretions and bronchiecatasis  Notes reviewed    Past Medical History--  Hypertension  No significant past surgical history      For details regarding the patient's social history, family history, and other miscellaneous elements, please refer the initial infectious diseases consultation and/or the admitting history and physical examination for this admission.    Allergies--  Allergies    penicillin (Unknown)    Intolerances        Medications--  Antibiotics:    Immunologic:    Other:  acetaminophen   Tablet .. PRN  ALBUTerol    0.083%  amLODIPine   Tablet  aspirin enteric coated  ATENolol  Tablet  enoxaparin Injectable      Review of Systems--  Review of systems unable to be obtained secondary to clinical condition.    Physical Examination--    Vital Signs: T(F): 98.7 (02-20-19 @ 15:46), Max: 98.7 (02-20-19 @ 13:50)  HR: 90 (02-20-19 @ 15:46)  BP: 119/84 (02-20-19 @ 15:46)  RR: 16 (02-20-19 @ 15:46)  SpO2: 95% (02-20-19 @ 15:46)  Wt(kg): --  General: Nontoxic-appearing Female in no acute distress.  HEENT: AT/NC. PERRL. EOMI. Anicteric. Conjunctiva pink and moist. Oropharynx clear. Dentition fair.  Neck: Not rigid. No sense of mass.  Nodes: None palpable.  Lungs: Clear bilaterally without rales, wheezing or rhonchi  Heart: Regular rate and rhythm. No Murmur. No rub. No gallop. No palpable thrill.  Abdomen: Bowel sounds present and normoactive. Soft. Nondistended. Nontender. No sense of mass. No organomegaly.  Back: No spinal tenderness. No costovertebral angle tenderness.   Extremities: No cyanosis or clubbing. No edema.   Skin: Warm. Dry. Good turgor. No rash. No vasculitic stigmata.  Psychiatric: Appropriate affect and mood for situation.         Laboratory Studies--  CBC                        10.8   5.70  )-----------( 323      ( 20 Feb 2019 06:25 )             31.7       Chemistries  02-20    145  |  108  |  15  ----------------------------<  96  4.0   |  33<H>  |  0.54    Ca    8.6      20 Feb 2019 06:25  Mg     2.3     02-20        Culture Data    Culture - Urine (collected 14 Feb 2019 11:07)  Source: .Urine Clean Catch (Midstream)  Final Report (15 Feb 2019 07:53):    <10,000 CFU/ml Normal Urogenital jessee present    Culture - Blood (collected 14 Feb 2019 09:23)  Source: .Blood Blood-Peripheral  Final Report (19 Feb 2019 10:01):    No growth at 5 days.    Culture - Blood (collected 14 Feb 2019 09:23)  Source: .Blood Blood-Peripheral  Final Report (19 Feb 2019 10:01):    No growth at 5 days.      Radiology:  Xray Chest 1 View- PORTABLE-Urgent (02.17.19 @ 06:41) >  INTERPRETATION:  History: Pyelonephritis with cough and crackles    Portable semiupright chest x-ray is compared to 2/14/2019.    The heart size is borderline. The trachea is midline. The lungs are   clear. There is osteopenia of the bony structures.    Impression: No active pulmonary disease      CT Chest No Cont (02.16.19 @ 16:35) >  CHEST:     CHEST WALL AND LOWER NECK: Within normal limits  MEDIASTINUM AND DEVEN: Within normal limits  HEART: Extensive coronary calcifications.  VESSELS: Extensive arterial calcifications.  LUNG AND LARGE AIRWAYS: Bilateral tree-in-bud nodules and mucoid impacted   distal bronchioles most prominent right lower lobe most compatible with   infection, worsened from 2011. No pleural effusion. 8 mm right apical   nodule stable.  VISUALIZED UPPER ABDOMEN: Right posterior lobe hypodensity decrease in   size from 2011 question including hemangioma. 4 mm nonobstructing left   renal calculus.  BONES: Within normal limits.    IMPRESSION: Bilateral bronchopneumonia, including but not limited to ROBERTO,   worsened from 2011.      CT Abdomen and Pelvis w/ Oral Cont and w/ IV Cont (02.14.19 @ 04:58) >  IMPRESSION:    Heterogeneously enhancing edematous right kidney with focal   low-attenuation opacity in the upper pole posteriorly. Slight prominence   of the right renal pelvis with peripheral wall enhancement. Findings are   likely in keeping with ascending tract infection or pyelonephritis.   Possibility of acute focal bacterial nephritis or developing intrarenal   abscess considered. Mild right perinephric stranding/trace fluid.   Scattered punctate nonobstructive bilateral renal calculi, more   pronounced on the right side.    Incompletely characterized left inferior pole renal hypodensity measuring   up to 1.6 cm. Additional too small to characterize bilateral renal   hypo densities. Consider nonemergent correlation with renal ultrasound for   better characterization.     Appendix is not visualized without secondary signs of acute appendicitis.    Additional findings as mentioned above.      Assessment :     77 y/o woman hx of HTN and with remote hx of kidney stones admitted with 1 day hx of severe chills at home with fever and right sided flank pain , found to have acute right sided pyelonephritis with possibility of developing abscess although clinically appears non toxic and has no significant  flank tenderness  . Based on her presentation she may be bacteremic . So far all cs negative . ?? Viral infection with bronchitis     Doubt her allergy to PCN, she has so far tolerated Meropenem well .     She continues to have fevers and cough, probably has bronchiectasis or recurrent aspiration . Vancomycin added .Seen by pulmonary     Plan :   - will observe off abx   - dc home in am is stable   - follow with pulmonary     - trend cbc / bmp   - follow cs results   - urology evaluation noted   - increase activity   - warm compresses to left AC q 4 hours     Continue with present regime .  Appropriate use of antibiotics and adverse effects reviewed.      I have discussed the above plan of care with patient and her son in detail. They expressed understanding of the treatment plan . Risks, benefits and alternatives discussed in detail. I have asked if they have any questions or concerns and appropriately addressed them to the best of my ability .      > 25 minutes spent in direct patient care reviewing  the notes, lab data/ imaging , discussion with multidisciplinary team. All questions were addressed and answered to the best of my capacity .    Thank you for allowing me to participate in the care of your patient .        Kayley Blanco MD  [phone]

## 2019-02-20 NOTE — PROGRESS NOTE ADULT - ASSESSMENT
79 y/o woman with PMH of HTN and af, diagnosed as an incidental finding about 8 months ago.  she had a raymundo-dccv and had what was most likely several months of eliquis, eventually discontinued.  It is not clear that she ever had any sxs from the af.    She was admitted to the hospital 2/14 with fever and abd pain, and was felt to have pyelo.  She was noted to have af on admission, rate controlled. She remained febrile and so had a ct of the chest, which revealed bilateral bronchopneumonia, with bee as a suspicion.  She is being considered for bronchoscopy.  Noting the af she has been placed recently on remote tele, which reveals af, thus far rate controlled.    Recommend:    - Remains in Afib, rate-controlled  - No evidence of volume overload or cardiac ischemia  - s/p bronchoscopy today, tolerated procedure well  - [id number]= 4, appropriate for long term ac.  Can switch Lovenox to NOAC if no further need for any procedure.  Monitor for s/s bleeding  - Given the lack of sxs a repeat dccv, antiarrhythmic or ablation strategy will not clearly be superior to rate control   - Continue ASA  - Continue Norvasc  and Atenolol 25 mg q12H  - TTE showed normal LVSF, mild to moderate AR, mild AS, and moderate MR  - monitor electrolytes, keep k>4, Mg>2  - Will continue to follow    Cristina Baez NP  Cardiology

## 2019-02-20 NOTE — PROGRESS NOTE ADULT - SUBJECTIVE AND OBJECTIVE BOX
Date/Time Patient Seen:  		  Referring MD:   Data Reviewed	       Patient is a 78y old  Female who presents with a chief complaint of fever, abdominal pain (19 Feb 2019 16:31)      Subjective/HPI     PAST MEDICAL & SURGICAL HISTORY:  Hypertension  No significant past surgical history        Medication list         MEDICATIONS  (STANDING):  ALBUTerol    0.083% 2.5 milliGRAM(s) Nebulizer every 12 hours  amLODIPine   Tablet 5 milliGRAM(s) Oral daily  aspirin enteric coated 81 milliGRAM(s) Oral daily  ATENolol  Tablet 25 milliGRAM(s) Oral every 12 hours  enoxaparin Injectable 70 milliGRAM(s) SubCutaneous every 12 hours    MEDICATIONS  (PRN):  acetaminophen   Tablet .. 650 milliGRAM(s) Oral every 6 hours PRN Temp greater or equal to 38C (100.4F), Mild Pain (1 - 3)         Vitals log        ICU Vital Signs Last 24 Hrs  T(C): 36.5 (20 Feb 2019 06:30), Max: 37 (19 Feb 2019 16:21)  T(F): 97.7 (20 Feb 2019 06:30), Max: 98.6 (19 Feb 2019 16:21)  HR: 86 (20 Feb 2019 06:30) (81 - 101)  BP: 124/82 (20 Feb 2019 06:30) (103/70 - 125/74)  BP(mean): --  ABP: --  ABP(mean): --  RR: 17 (20 Feb 2019 06:30) (16 - 17)  SpO2: 97% (20 Feb 2019 06:30) (94% - 97%)           Input and Output:  I&O's Detail      Lab Data                        10.8   5.70  )-----------( 323      ( 20 Feb 2019 06:25 )             31.7     02-20    145  |  108  |  15  ----------------------------<  96  4.0   |  33<H>  |  0.54    Ca    8.6      20 Feb 2019 06:25  Mg     2.3     02-20        CARDIAC MARKERS ( 19 Feb 2019 06:45 )  <.015 ng/mL / x     / 14 U/L / x     / <1.0 ng/mL        Review of Systems	      Objective     Physical Examination    heart s1s2  lung dec BS  abd soft  on room air  cough noted      Pertinent Lab findings & Imaging      [place]:  NO   Adequate UO     I&O's Detail           Discussed with:     Cultures:	        Radiology

## 2019-02-21 ENCOUNTER — TRANSCRIPTION ENCOUNTER (OUTPATIENT)
Age: 79
End: 2019-02-21

## 2019-02-21 VITALS — WEIGHT: 160.06 LBS

## 2019-02-21 LAB
NIGHT BLUE STAIN TISS: SIGNIFICANT CHANGE UP
SPECIMEN SOURCE: SIGNIFICANT CHANGE UP

## 2019-02-21 PROCEDURE — 83605 ASSAY OF LACTIC ACID: CPT

## 2019-02-21 PROCEDURE — 74177 CT ABD & PELVIS W/CONTRAST: CPT

## 2019-02-21 PROCEDURE — 99285 EMERGENCY DEPT VISIT HI MDM: CPT | Mod: 25

## 2019-02-21 PROCEDURE — 80053 COMPREHEN METABOLIC PANEL: CPT

## 2019-02-21 PROCEDURE — 84100 ASSAY OF PHOSPHORUS: CPT

## 2019-02-21 PROCEDURE — 71250 CT THORAX DX C-: CPT

## 2019-02-21 PROCEDURE — 83690 ASSAY OF LIPASE: CPT

## 2019-02-21 PROCEDURE — 94640 AIRWAY INHALATION TREATMENT: CPT

## 2019-02-21 PROCEDURE — 80048 BASIC METABOLIC PNL TOTAL CA: CPT

## 2019-02-21 PROCEDURE — 87040 BLOOD CULTURE FOR BACTERIA: CPT

## 2019-02-21 PROCEDURE — 99232 SBSQ HOSP IP/OBS MODERATE 35: CPT

## 2019-02-21 PROCEDURE — 87631 RESP VIRUS 3-5 TARGETS: CPT

## 2019-02-21 PROCEDURE — 82550 ASSAY OF CK (CPK): CPT

## 2019-02-21 PROCEDURE — 85027 COMPLETE CBC AUTOMATED: CPT

## 2019-02-21 PROCEDURE — 93306 TTE W/DOPPLER COMPLETE: CPT

## 2019-02-21 PROCEDURE — 87116 MYCOBACTERIA CULTURE: CPT

## 2019-02-21 PROCEDURE — 71046 X-RAY EXAM CHEST 2 VIEWS: CPT

## 2019-02-21 PROCEDURE — 84443 ASSAY THYROID STIM HORMONE: CPT

## 2019-02-21 PROCEDURE — 71045 X-RAY EXAM CHEST 1 VIEW: CPT

## 2019-02-21 PROCEDURE — 87086 URINE CULTURE/COLONY COUNT: CPT

## 2019-02-21 PROCEDURE — 84145 PROCALCITONIN (PCT): CPT

## 2019-02-21 PROCEDURE — 87015 SPECIMEN INFECT AGNT CONCNTJ: CPT

## 2019-02-21 PROCEDURE — 83735 ASSAY OF MAGNESIUM: CPT

## 2019-02-21 PROCEDURE — 85730 THROMBOPLASTIN TIME PARTIAL: CPT

## 2019-02-21 PROCEDURE — 97530 THERAPEUTIC ACTIVITIES: CPT

## 2019-02-21 PROCEDURE — 80202 ASSAY OF VANCOMYCIN: CPT

## 2019-02-21 PROCEDURE — 93005 ELECTROCARDIOGRAM TRACING: CPT

## 2019-02-21 PROCEDURE — 94664 DEMO&/EVAL PT USE INHALER: CPT

## 2019-02-21 PROCEDURE — 87070 CULTURE OTHR SPECIMN AEROBIC: CPT

## 2019-02-21 PROCEDURE — 87186 SC STD MICRODIL/AGAR DIL: CPT

## 2019-02-21 PROCEDURE — 36415 COLL VENOUS BLD VENIPUNCTURE: CPT

## 2019-02-21 PROCEDURE — 82553 CREATINE MB FRACTION: CPT

## 2019-02-21 PROCEDURE — 99221 1ST HOSP IP/OBS SF/LOW 40: CPT

## 2019-02-21 PROCEDURE — 87206 SMEAR FLUORESCENT/ACID STAI: CPT

## 2019-02-21 PROCEDURE — 81001 URINALYSIS AUTO W/SCOPE: CPT

## 2019-02-21 PROCEDURE — 97162 PT EVAL MOD COMPLEX 30 MIN: CPT

## 2019-02-21 PROCEDURE — 84436 ASSAY OF TOTAL THYROXINE: CPT

## 2019-02-21 PROCEDURE — 87102 FUNGUS ISOLATION CULTURE: CPT

## 2019-02-21 PROCEDURE — 96365 THER/PROPH/DIAG IV INF INIT: CPT

## 2019-02-21 PROCEDURE — 97116 GAIT TRAINING THERAPY: CPT

## 2019-02-21 PROCEDURE — 94760 N-INVAS EAR/PLS OXIMETRY 1: CPT

## 2019-02-21 PROCEDURE — 85610 PROTHROMBIN TIME: CPT

## 2019-02-21 PROCEDURE — 84484 ASSAY OF TROPONIN QUANT: CPT

## 2019-02-21 RX ORDER — APIXABAN 2.5 MG/1
1 TABLET, FILM COATED ORAL
Qty: 0 | Refills: 0 | COMMUNITY
Start: 2019-02-21

## 2019-02-21 RX ADMIN — ATENOLOL 25 MILLIGRAM(S): 25 TABLET ORAL at 05:08

## 2019-02-21 RX ADMIN — Medication 81 MILLIGRAM(S): at 12:04

## 2019-02-21 RX ADMIN — APIXABAN 5 MILLIGRAM(S): 2.5 TABLET, FILM COATED ORAL at 05:08

## 2019-02-21 NOTE — PROGRESS NOTE ADULT - PROBLEM SELECTOR PLAN 1
atelectasis  bronchiectasis  bronchitis  mucus plugging  cont NEBS  AF - care - AC and rate control and BP control  will need Pulm follow up - with Dr. De La Cruz - in [place]  tolerating room air  pt has significant post nasal drip and chronic bronchitis  discussed with Son and Patient

## 2019-02-21 NOTE — PROGRESS NOTE ADULT - PROBLEM SELECTOR PLAN 2
Continue atenolol and norvasc with hold parameters
Possible PNA +/- bronchiectasis  Vancomycin 1g q12h added  May proceed for bronch when cleared by cardio -- possibly tomorrow  Albuterol q12h  Pulmonary consult noted
Continue atenolol and norvasc with hold parameters
Now with recurrent atrial fibrillation -- rate controlled  Started on full dose lovenox; switch to eliquis upon discharge  Continue atenolol for rate control  Monitor on remote tele  Check ECHO (or get old ECHO results)
Now with recurrent atrial fibrillation -- rate controlled  eliquis upon discharge  Continue atenolol for rate control  Monitor on remote tele  Check ECHO (or get old ECHO results)
Possible PNA +/- bronchiectasis  Vancomycin 1g q12h added  May need bronch  Albuterol q12h  Pulmonary consult noted
Possible PNA +/- bronchiectasis  Vancomycin 1g q12h added  May need bronch  Albuterol q12h  Pulmonary consult noted

## 2019-02-21 NOTE — PROGRESS NOTE ADULT - SUBJECTIVE AND OBJECTIVE BOX
78y Female     T(C): 36.6 (02-21-19 @ 07:35), Max: 37.6 (02-20-19 @ 23:28)  HR: 77 (02-21-19 @ 07:35) (77 - 94)  BP: 112/72 (02-21-19 @ 07:35) (102/65 - 120/79)  RR: 16 (02-21-19 @ 07:35) (16 - 17)  SpO2: 95% (02-21-19 @ 07:35) (93% - 95%)  Wt(kg): --    Pt seen, doing well, no anesthesia complications or complaints noted or reported.   No Nausea  Pain well controlled

## 2019-02-21 NOTE — PROGRESS NOTE ADULT - ASSESSMENT
79 y/o woman with PMH of HTN and af, diagnosed as an incidental finding about 8 months ago.  she had a raymundo-dccv and had what was most likely several months of eliquis, eventually discontinued.  It is not clear that she ever had any sxs from the af.    She was admitted to the hospital 2/14 with fever and abd pain, and was felt to have pyelo.  She was noted to have af on admission, rate controlled. She remained febrile and so had a ct of the chest, which revealed bilateral bronchopneumonia, with bee as a suspicion.  She is being considered for bronchoscopy.  Noting the af she has been placed recently on remote tele, which reveals af, thus far rate controlled.    Recommend:    - Remains in Afib, rate-controlled  - No evidence of volume overload or cardiac ischemia  - s/p bronchoscopy yesterday, tolerated procedure well.  She remained stable  - IBP1YH1Vmpo= 4, appropriate for long term ac.  Can switch Lovenox to NOAC if no further need for any procedure.  Monitor for s/s bleeding  - Given the lack of sxs a repeat dccv, antiarrhythmic or ablation strategy will not clearly be superior to rate control   - Continue ASA  - Continue Norvasc  and Atenolol 25 mg q12H  - TTE showed normal LVSF, mild to moderate AR, mild AS, and moderate MR  - Monitor electrolytes, keep k>4, Mg>2  - For discharge.  Will follow up with her private Cardio in [facility].    Cristina Baez NP  Cardiology

## 2019-02-21 NOTE — DIETITIAN INITIAL EVALUATION ADULT. - OTHER INFO
Pt seen for LOS. As per chart pt is a 78 year old female with a PMH of HTN. Pt admitted with pyelonephritis, found to have chronic bronchitis. Pt reports fair but slightly decreased appetite, however states that she is eating well, per chart consuming about 100% of meals in house. Pt's current weight per chart (2/17) 162lbs, (admission wt) 160.1lbs. Pt unsure of current weight, reports UBW of 160lbs, stable. Pt denies any chewing/swallowing difficulties, denies any GI distress, +BM 2/18.

## 2019-02-21 NOTE — DISCHARGE NOTE ADULT - CARE PLAN
Principal Discharge DX:	Pyelonephritis  Goal:	.  Assessment and plan of treatment:	Follow-up with Dr Dc in 2 weeks  Follow-up with your primary care doctor within 1 week.  Secondary Diagnosis:	Mucus plugging of bronchi  Assessment and plan of treatment:	Follow-up with Dr De La Cruz in 1-2 weeks  Follow-up with your primary care doctor within 1 week.  Secondary Diagnosis:	Chronic atrial fibrillation  Assessment and plan of treatment:	Continue current medications  Follow-up with your cardiologist in 1 week.  Follow-up with your primary care doctor within 1 week.

## 2019-02-21 NOTE — PROGRESS NOTE ADULT - PROBLEM SELECTOR PLAN 5
Likely secondary to pyelonephritis and pna  All cultures, including RVP, NTD  Continue to monitor  Fever curve trending downward
GI/DVT prophylaxis
Improved
Improving  D/C IVF
Likely secondary to pyelonephritis and pna  All cultures, including RVP, NTD  Continue to monitor  Fever curve trending downward
Likely secondary to pyelonephritis and pna  All cultures, including RVP, NTD  Continue to monitor  Fever curve trending downward

## 2019-02-21 NOTE — DISCHARGE NOTE ADULT - CARE PROVIDER_API CALL
Chuyita De La Cruz)  Critical Care Medicine; Internal Medicine; Pulmonary Disease  [address], [address]  [place] [address]  Phone: [phone]  Fax: [phone]  Follow Up Time: Chuyita De La Cruz)  Critical Care Medicine; Internal Medicine; Pulmonary Disease  [address], [address]  [place] [address]  Phone: [phone]  Fax: [phone]  Follow Up Time: 2 weeks    Jem Dc)  Urology  [address], [address]  [place] [address]  Phone: [phone]  Fax: [phone]  Follow Up Time: 2 weeks    Carmelo,   Phone: (   )    -  Fax: (   )    -  Follow Up Time: 1 week

## 2019-02-21 NOTE — PROGRESS NOTE ADULT - ATTENDING COMMENTS
Seen/examined. agree with above.
seen/examined. agree with above.
Chart reviewed    Patient seen and examined    Agree with plans outlined above
D/C home
D/C planning in AM

## 2019-02-21 NOTE — PROGRESS NOTE ADULT - REASON FOR ADMISSION
fever, abdominal pain

## 2019-02-21 NOTE — DISCHARGE NOTE ADULT - HOSPITAL COURSE
77 y/o woman with PMH of HTN and cardiac arrhythmia (s/p cardioversion in Summer 2018) and remote hx of kidney stones had severe shaking chills followed by  temp 101.5 at home 1 day PTA. She developed rt sided abdominal and flank  pain and came to ED. In ED she had CT abd and pelvis done and  was found to have right sided pyelonephritis with possible right renal abscess, so admitted for treatment and further work up .     She has hx of recurrent UTI, last one was 1 month ago . No hx of DM . No nausea or vomiting .    Admitted with pyelonephritis and treated with iv abx  Also treated for PNA  Bronch mucous plugging  All cultures NTD  Found to be in recurrent AF  A/C started    >30 minutes spent on discharge

## 2019-02-21 NOTE — DISCHARGE NOTE ADULT - PLAN OF CARE
. Follow-up with Dr Dc in 2 weeks  Follow-up with your primary care doctor within 1 week. Follow-up with Dr De La Cruz in 1-2 weeks  Follow-up with your primary care doctor within 1 week. Continue current medications  Follow-up with your cardiologist in 1 week.  Follow-up with your primary care doctor within 1 week.

## 2019-02-21 NOTE — DIETITIAN INITIAL EVALUATION ADULT. - ADHERENCE
n/a/Pt reports that she does not follow any therapeutic diets PTA, states that her family helps her prepare meals.

## 2019-02-21 NOTE — PROGRESS NOTE ADULT - PROBLEM SELECTOR PROBLEM 4
Essential hypertension
Fever
Fever
Malaise and fatigue

## 2019-02-21 NOTE — PROGRESS NOTE ADULT - PROBLEM SELECTOR PROBLEM 3
Prophylactic measure
Chronic atrial fibrillation
Essential hypertension
Essential hypertension
Fever
Pyelonephritis
Pyelonephritis

## 2019-02-21 NOTE — DISCHARGE NOTE ADULT - PROVIDER TOKENS
PROVIDER:[TOKEN:[[id number]]] PROVIDER:[TOKEN:[[id number]],FOLLOWUP:[2 weeks]],PROVIDER:[TOKEN:[[id number]],FOLLOWUP:[2 weeks]],FREE:[LAST:[[employer]],PHONE:[(   )    -],FAX:[(   )    -],FOLLOWUP:[1 week]]

## 2019-02-21 NOTE — PROGRESS NOTE ADULT - PROBLEM SELECTOR PROBLEM 2
Essential hypertension
Mucus plugging of bronchi
Chronic atrial fibrillation
Chronic atrial fibrillation
Essential hypertension
Mucus plugging of bronchi
Mucus plugging of bronchi

## 2019-02-21 NOTE — PROGRESS NOTE ADULT - PROBLEM SELECTOR PLAN 1
Possible PNA +/- bronchiectasis  Off abx  S/P bronch-- a lot of mucous plugging seen, no endobronchial lesions  Albuterol q12h  Pulmonary consult noted

## 2019-02-21 NOTE — PROGRESS NOTE ADULT - SUBJECTIVE AND OBJECTIVE BOX
Date/Time Patient Seen:  		  Referring MD:   Data Reviewed	       Patient is a 78y old  Female who presents with a chief complaint of fever, abdominal pain (20 Feb 2019 16:13)      Subjective/HPI     PAST MEDICAL & SURGICAL HISTORY:  Hypertension  No significant past surgical history        Medication list         MEDICATIONS  (STANDING):  ALBUTerol    0.083% 2.5 milliGRAM(s) Nebulizer every 12 hours  amLODIPine   Tablet 5 milliGRAM(s) Oral daily  apixaban 5 milliGRAM(s) Oral every 12 hours  aspirin enteric coated 81 milliGRAM(s) Oral daily  ATENolol  Tablet 25 milliGRAM(s) Oral every 12 hours    MEDICATIONS  (PRN):  acetaminophen   Tablet .. 650 milliGRAM(s) Oral every 6 hours PRN Temp greater or equal to 38C (100.4F), Mild Pain (1 - 3)         Vitals log        ICU Vital Signs Last 24 Hrs  T(C): 36.6 (21 Feb 2019 07:35), Max: 37.6 (20 Feb 2019 23:28)  T(F): 97.9 (21 Feb 2019 07:35), Max: 99.7 (20 Feb 2019 23:28)  HR: 77 (21 Feb 2019 07:35) (77 - 95)  BP: 112/72 (21 Feb 2019 07:35) (102/65 - 124/71)  BP(mean): --  ABP: --  ABP(mean): --  RR: 16 (21 Feb 2019 07:35) (14 - 17)  SpO2: 95% (21 Feb 2019 07:35) (93% - 100%)           Input and Output:  I&O's Detail    20 Feb 2019 07:01  -  21 Feb 2019 07:00  --------------------------------------------------------  IN:    Lactated Ringers IV Bolus: 300 mL    Oral Fluid: 50 mL  Total IN: 350 mL    OUT:  Total OUT: 0 mL    Total NET: 350 mL          Lab Data                        10.8   5.70  )-----------( 323      ( 20 Feb 2019 06:25 )             31.7     02-20    145  |  108  |  15  ----------------------------<  96  4.0   |  33<H>  |  0.54    Ca    8.6      20 Feb 2019 06:25  Mg     2.3     02-20              Review of Systems	      Objective     Physical Examination    heart s1s2  lung dec BS  abd soft  cn grossly int      Pertinent Lab findings & Imaging      Jany:  NO   Adequate UO     I&O's Detail    20 Feb 2019 07:01  -  21 Feb 2019 07:00  --------------------------------------------------------  IN:    Lactated Ringers IV Bolus: 300 mL    Oral Fluid: 50 mL  Total IN: 350 mL    OUT:  Total OUT: 0 mL    Total NET: 350 mL               Discussed with:     Cultures:	        Radiology

## 2019-02-21 NOTE — PROGRESS NOTE ADULT - PROBLEM SELECTOR PROBLEM 1
Pyelonephritis
Mucus plugging of bronchi
Pyelonephritis

## 2019-02-21 NOTE — DIETITIAN INITIAL EVALUATION ADULT. - ENERGY NEEDS
Wt: 160.1lbs (admission), Ht: 67in, BMI: 25.1, IBW: 140lbs (+/-10%), %IBW: 114  No edema or pressure injuries noted at this time

## 2019-02-21 NOTE — PROGRESS NOTE ADULT - SUBJECTIVE AND OBJECTIVE BOX
Patient is a 78y old  Female who presents with a chief complaint of fever, abdominal pain (21 Feb 2019 07:38)      INTERVAL HPI/OVERNIGHT EVENTS: Patient seen and examined. NAD. No complaints.    Vital Signs Last 24 Hrs  T(C): 36.6 (21 Feb 2019 07:35), Max: 37.6 (20 Feb 2019 23:28)  T(F): 97.9 (21 Feb 2019 07:35), Max: 99.7 (20 Feb 2019 23:28)  HR: 77 (21 Feb 2019 07:35) (77 - 95)  BP: 112/72 (21 Feb 2019 07:35) (102/65 - 120/79)  BP(mean): --  RR: 16 (21 Feb 2019 07:35) (14 - 17)  SpO2: 95% (21 Feb 2019 07:35) (93% - 96%)    02-20    145  |  108  |  15  ----------------------------<  96  4.0   |  33<H>  |  0.54    Ca    8.6      20 Feb 2019 06:25  Mg     2.3     02-20                            10.8   5.70  )-----------( 323      ( 20 Feb 2019 06:25 )             31.7       CAPILLARY BLOOD GLUCOSE                  acetaminophen   Tablet .. 650 milliGRAM(s) Oral every 6 hours PRN  ALBUTerol    0.083% 2.5 milliGRAM(s) Nebulizer every 12 hours  amLODIPine   Tablet 5 milliGRAM(s) Oral daily  apixaban 5 milliGRAM(s) Oral every 12 hours  aspirin enteric coated 81 milliGRAM(s) Oral daily  ATENolol  Tablet 25 milliGRAM(s) Oral every 12 hours              REVIEW OF SYSTEMS:  CONSTITUTIONAL: No fever, no weight loss, or no fatigue  NECK: No pain, no stiffness  RESPIRATORY: No cough, no wheezing, no chills, no hemoptysis, No shortness of breath  CARDIOVASCULAR: No chest pain, no palpitations, no dizziness, no leg swelling  GASTROINTESTINAL: No abdominal pain. No nausea, no vomiting, no hematemesis; No diarrhea, no constipation. No melena, no hematochezia.  GENITOURINARY: No dysuria, no frequency, no hematuria, no incontinence  NEUROLOGICAL: No headaches, no loss of strength, no numbness, no tremors  SKIN: No itching, no burning  MUSCULOSKELETAL: No joint pain, no swelling; No muscle, no back, no extremity pain  PSYCHIATRIC: No depression, no mood swings,   HEME/LYMPH: No easy bruising, no bleeding gums  ALLERY AND IMMUNOLOGIC: No hives       Consultant(s) Notes Reviewed:  [X] YES  [ ] NO    PHYSICAL EXAM:  GENERAL: NAD  HEAD:  Atraumatic, Normocephalic  EYES: EOMI, PERRLA, conjunctiva and sclera clear  ENMT: No tonsillar erythema, exudates, or enlargement; Moist mucous membranes  NECK: Supple, No JVD  NERVOUS SYSTEM:  Awake & alert  CHEST/LUNG: Clear to auscultation bilaterally; No rales, rhonchi, wheezing,  HEART: Regular rate and rhythm  ABDOMEN: Soft, Nontender, Nondistended; Bowel sounds present  EXTREMITIES:  No clubbing, cyanosis, or edema  LYMPH: No lymphadenopathy noted  SKIN: No rashes      Advanced care planning discussed with patient/family [X] YES   [ ] NO    Advanced care planning discussed with patient/family. Advanced care planning forms reviewed/discussed/completed. 20 minutes spent.

## 2019-02-21 NOTE — DISCHARGE NOTE ADULT - PATIENT PORTAL LINK FT
You can access the [facility] Patient Portal, offered by [facility], by registering with the following website: [email or web address]

## 2019-02-21 NOTE — DIETITIAN INITIAL EVALUATION ADULT. - NS AS NUTRI INTERV MEALS SNACK
General/healthful diet/Recommend to continue with current DASH/TLC diet. Pt encouraged to continue good PO intake. RD to remain available.

## 2019-02-21 NOTE — DISCHARGE NOTE ADULT - CARE PROVIDERS DIRECT ADDRESSES
[email or web address],DirectAddress_Unknown,DirectAddress_Unknown

## 2019-02-21 NOTE — PROGRESS NOTE ADULT - PROVIDER SPECIALTY LIST ADULT
Anesthesia
Cardiology
Infectious Disease
Internal Medicine
Pulmonology
Urology
Internal Medicine
Internal Medicine

## 2019-02-21 NOTE — PROGRESS NOTE ADULT - SUBJECTIVE AND OBJECTIVE BOX
[facility] -- Tanika Suero, Porter, Alejandra, Rickie Krueger Savella  Office # [phone]    Follow Up:  Afib    Subjective/Observations: Awake and alert, remains on RA.  S/P bronch today.  Ambulating to the bathroom with minimal assistance.  No GROVES.  Denies CP or palpitation.  Awaiting discharge    REVIEW OF SYSTEMS: All other review of systems is negative unless indicated above    PAST MEDICAL & SURGICAL HISTORY:  Hypertension  No significant past surgical history    MEDICATIONS  (STANDING):  ALBUTerol    0.083% 2.5 milliGRAM(s) Nebulizer every 12 hours  amLODIPine   Tablet 5 milliGRAM(s) Oral daily  apixaban 5 milliGRAM(s) Oral every 12 hours  aspirin enteric coated 81 milliGRAM(s) Oral daily  ATENolol  Tablet 25 milliGRAM(s) Oral every 12 hours    MEDICATIONS  (PRN):  acetaminophen   Tablet .. 650 milliGRAM(s) Oral every 6 hours PRN Temp greater or equal to 38C (100.4F), Mild Pain (1 - 3)    Allergies    penicillin (Unknown)    Intolerances    Vital Signs Last 24 Hrs  T(C): 36.6 (21 Feb 2019 07:35), Max: 37.6 (20 Feb 2019 23:28)  T(F): 97.9 (21 Feb 2019 07:35), Max: 99.7 (20 Feb 2019 23:28)  HR: 77 (21 Feb 2019 07:35) (77 - 92)  BP: 112/72 (21 Feb 2019 07:35) (110/69 - 120/79)  BP(mean): --  RR: 16 (21 Feb 2019 07:35) (16 - 16)  SpO2: 95% (21 Feb 2019 07:35) (95% - 95%)    I&O's Summary    20 Feb 2019 07:01  -  21 Feb 2019 07:00  --------------------------------------------------------  IN: 350 mL / OUT: 0 mL / NET: 350 mL    PHYSICAL EXAM:  TELE: Not on tele  Constitutional: NAD, awake and alert, well-developed  HEENT: Moist Mucous Membranes, Anicteric  Pulmonary: Non-labored, breath sounds are clear bilaterally, No wheezing, rales.  + rhonchi at bases  Cardiovascular: Regular, S1 and S2, No murmurs, rubs, gallops or clicks  Gastrointestinal: Bowel Sounds present, soft, nontender.   Lymph: No peripheral edema. No lymphadenopathy.  Skin: No visible rashes or ulcers.  Psych:  Mood & affect appropriate    LABS: All Labs Reviewed:                        10.8   5.70  )-----------( 323      ( 20 Feb 2019 06:25 )             31.7                         11.1   5.34  )-----------( 311      ( 19 Feb 2019 06:45 )             33.2     20 Feb 2019 06:25    145    |  108    |  15     ----------------------------<  96     4.0     |  33     |  0.54   19 Feb 2019 06:45    145    |  106    |  10     ----------------------------<  117    3.9     |  32     |  0.53     Ca    8.6        20 Feb 2019 06:25  Ca    8.5        19 Feb 2019 06:45  Mg     2.3       20 Feb 2019 06:25  Mg     2.2       19 Feb 2019 06:45    < from: Xray Chest 1 View- PORTABLE-Urgent (02.17.19 @ 06:41) >    EXAM:  XR CHEST PORTABLE URGENT 1V                            PROCEDURE DATE:  02/17/2019      INTERPRETATION:  History: Pyelonephritis with cough and crackles    Portable semiupright chest x-ray is compared to 2/14/2019.    The heart size is borderline. The trachea is midline. The lungs are   clear. There is osteopenia of the bony structures.    Impression: No active pulmonary disease    JOSE OTTO M.D.,ATTENDING RADIOLOGIST  This document has been electronically signed. Feb17 2019  9:21AM      < end of copied text >    < from: CT Chest No Cont (02.16.19 @ 16:35) >    EXAM:  CT CHEST                          PROCEDURE DATE:  02/16/2019      INTERPRETATION:  CLINICAL INFORMATION: Fever, cough rule out pneumonia    COMPARISON: 2011    PROCEDURE:   CT of the Chest was performed without intravenous contrast.  Sagittal and coronal reformats were performed.      FINDINGS:    CHEST:     CHEST WALL AND LOWER NECK: Within normal limits  MEDIASTINUM AND DEVEN: Within normal limits  HEART: Extensive coronary calcifications.  VESSELS: Extensive arterial calcifications.  LUNG AND LARGE AIRWAYS: Bilateral tree-in-bud nodules and mucoid impacted   distal bronchioles most prominent right lower lobe most compatible with   infection, worsened from 2011. No pleural effusion. 8 mm right apical   nodule stable.  VISUALIZED UPPER ABDOMEN: Right posterior lobe hypodensity decrease in   size from 2011 question including hemangioma. 4 mm nonobstructing left   renal calculus.  BONES: Within normal limits.    IMPRESSION: Bilateral bronchopneumonia, including but not limited to ROBERTO,   worsened from 2011.    CLEO BELTRAN M.D., ATTENDING RADIOLOGIST  This document has been electronically signed. Feb 16 2019  4:43PM      < end of copied text >    < from: TTE Echo Doppler w/o Cont (02.19.19 @ 12:05) >     EXAM:  ECHO TTE WO CON COMP W DOPPLR         PROCEDURE DATE:  02/19/2019        INTERPRETATION:  Ordering Physician: TENISHA BYERS [id number]    Indication: Abnormal EKG    Study Quality: Technically difficult   A complete echocardiographic studywas performed utilizing standard   protocol including spectral and color Doppler in all echocardiographic   windows.    Height: 170 cm  Weight: 72 kg  BSA: 1.84 sq m  Blood Pressure: 103/70    MEASUREMENTS  IVS: 1.2cm  PWT: 1.1cm  LA: 3.9cm  AO: 3.6cm  LVIDd: 4.9cm  LVIDs: 3.5cm  LVOTd: 2.1cm    LVEF: 55-60%  RVSP: 30mmHg    FINDINGS  Left Ventricle: The endocardium is not well-visualized. Grossly normal   left ventricular systolic function.  Aortic Valve: Calcified aortic valve with decreased opening. The aortic   valve gradient is equal to 16 mmHg, and the mean aortic valve gradient is   equal to 10 mmHg, consistent with mild aortic stenosis. Mild to moderate   aortic insufficiency  Mitral Valve Thickened mitral valve leaflets with moderatemitral   regurgitation  Tricuspid Valve: Grossly normal tricuspid valve. Mild tricuspid   regurgitation  Pulmonic Valve: Not well-visualized. Trace pulmonic insufficiency.  Left Atrium: Moderate left atrial enlargement  Right Ventricle: Grossly normal RV size and systolic function   Right Atrium: Right atrial enlargement  Diastolic Function: Indeterminant diastolic function  Pericardium/Pleura: No pericardial effusion  Hemodynamics: The pulmonary artery systolic pressure is estimated to be   30 mmHg, assuming the right atrial pressure is equal to 8 mmHg,   consistent with normal pulmonary pressures.    CONCLUSIONS:  1. Technically difficult study  2. The endocardium is not well-visualized. Grossly normal left   ventricular systolic function.  3. Calcified aortic valve with decreased opening. Mild aortic stenosis.   Mild to moderate aortic insufficiency.  4. Thickened mitral valve leaflets with moderate mitral regurgitation  5. Biatrial enlargement  6. Normal pulmonary pressures  7. No pericardial effusion    BONITA ROONEY   This document has been electronically signed. Feb 20 2019  6:17AM     < end of copied text >

## 2019-02-21 NOTE — DISCHARGE NOTE ADULT - MEDICATION SUMMARY - MEDICATIONS TO TAKE
I will START or STAY ON the medications listed below when I get home from the hospital:    ROLLING WALKER  -- Indication: For gait ataxia    aspirin 81 mg oral delayed release tablet  -- 1 tab(s) by mouth once a day  -- Indication: For Prophylactic measure    apixaban 5 mg oral tablet  -- 1 tab(s) by mouth every 12 hours  -- Indication: For Chronic atrial fibrillation    atenolol 50 mg oral tablet  -- 25 milligram(s) by mouth 2 times a day  -- Indication: For Essential hypertension    amLODIPine 5 mg oral tablet  -- 1 tab(s) by mouth once a day  -- Indication: For Essential hypertension

## 2019-02-22 LAB
-  AMIKACIN: SIGNIFICANT CHANGE UP
-  AMIKACIN: SIGNIFICANT CHANGE UP
-  AZTREONAM: SIGNIFICANT CHANGE UP
-  AZTREONAM: SIGNIFICANT CHANGE UP
-  CEFEPIME: SIGNIFICANT CHANGE UP
-  CEFEPIME: SIGNIFICANT CHANGE UP
-  CEFTAZIDIME: SIGNIFICANT CHANGE UP
-  CEFTAZIDIME: SIGNIFICANT CHANGE UP
-  CIPROFLOXACIN: SIGNIFICANT CHANGE UP
-  CIPROFLOXACIN: SIGNIFICANT CHANGE UP
-  GENTAMICIN: SIGNIFICANT CHANGE UP
-  GENTAMICIN: SIGNIFICANT CHANGE UP
-  IMIPENEM: SIGNIFICANT CHANGE UP
-  IMIPENEM: SIGNIFICANT CHANGE UP
-  LEVOFLOXACIN: SIGNIFICANT CHANGE UP
-  LEVOFLOXACIN: SIGNIFICANT CHANGE UP
-  MEROPENEM: SIGNIFICANT CHANGE UP
-  MEROPENEM: SIGNIFICANT CHANGE UP
-  PIPERACILLIN/TAZOBACTAM: SIGNIFICANT CHANGE UP
-  PIPERACILLIN/TAZOBACTAM: SIGNIFICANT CHANGE UP
-  TOBRAMYCIN: SIGNIFICANT CHANGE UP
-  TOBRAMYCIN: SIGNIFICANT CHANGE UP
CULTURE RESULTS: SIGNIFICANT CHANGE UP
METHOD TYPE: SIGNIFICANT CHANGE UP
METHOD TYPE: SIGNIFICANT CHANGE UP
ORGANISM # SPEC MICROSCOPIC CNT: SIGNIFICANT CHANGE UP
SPECIMEN SOURCE: SIGNIFICANT CHANGE UP

## 2019-03-21 LAB
CULTURE RESULTS: SIGNIFICANT CHANGE UP
SPECIMEN SOURCE: SIGNIFICANT CHANGE UP

## 2019-04-13 LAB
CULTURE RESULTS: SIGNIFICANT CHANGE UP
SPECIMEN SOURCE: SIGNIFICANT CHANGE UP

## 2020-01-24 NOTE — ED ADULT NURSE NOTE - CAS TRG GENERAL NORM CIRC DET
H&P/Consult Note/Progress Note/Office Note:  
Veronique Segura  MRN: [id number]  :1965  Age:54 y.o. 
 
HPI: Veronique Segura is a 62 y.o. 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 He has h/o ETOH abuse, tobacco abuse, meth abuse, homelessness, COPD, CHF,  
who was admitted from the ER after presenting with a several week h/o progressive N/V, inability to sustain PO intake and weight loss (>100lbs) Nothing in particular made his symptoms better or worse. He also had associated 2/10 substernal chest pain. No SOB He had not been taking his maint meds in >6 months Pertinent negatives include no back pain, no claudication, no cough, no diaphoresis, no dizziness 20 s/p EGD with esophageal bx; Dr Anamika Galindo DIAGNOSIS ESOPHAGEAL MASS BIOPSIES: MODERATELY DIFFERENTIATED ADENOCARCINOMA. Electronically signed out on 2020 14:21 by Carlos Tellez. Angela Whelan MD  
Findings: At 32-46cm there is a circumferential, partially obstructing, fungating, ulcerated mass concerning for malignancy. Extensive biopsies were obtained. STOMACH:  The mass extends into the proximal stomach. The fundus on antegrade and retroflexed views is normal. The body, antrum, and pylorus are normal.  
DUODENUM:  The bulb and second portions are normal.  
There are multiple enlarged periesophageal lymph nodes. The largest measures 34 x 14 mm in size. FNA not performed as the needle which averse the tumor and confirmation of malignancy would not affect staging. Further evaluation of the distal extent of tumor as well as stomach and liver could not be performed as the scope could not safely traverse the mass. Impression:   
Esophageal mass. This is concerning for a least T3N2Mx based on endoscopic ultrasound criteria. As the tumor could not be traversed, the distal extent could not be evaluated for invasion of adjacent structures such as the diaphragm. Thus, the possibility of understaging exists. More importantly, there is strong suspicion for metastatic liver and lung disease based on CT.  
  
GI recs:  As patient likely has unresectable malignancy, he may be a candidate for palliative esophageal stent placement. This would require multiple stents due to the length of the tumor. Based on extension into the stomach, he will be at increased risk of stent migration.  
  
 
 
 
 
1/16/20 CT chest/abd/pelvis with oral and IV contrast 
Hx:   Follow-up abnormal chest radiograph smoker with pulmonary 
nodules and 100-pound weight loss over the last several months. 
  
CT CHEST: There are innumerable bilateral pulmonary nodules, the largest 
measuring approximately 3.9 cm posteriorly in the right lower lobe. There is 
marked thickening of the wall of the distal esophagus below the level of the 
hilary which suggests the possibility of primary esophageal carcinoma. There is 
associated subcarinal and azygoesophageal recess lymphadenopathy. No 
significant pleural fluid. The thyroid appears normal as imaged. There is 
thickening of the left ventricular wall suggesting a hypertrophy. 
  
CT ABDOMEN: There are innumerable small hepatic lesions which are new from 2018 
and also suspicious for metastatic disease. The largest measures approximately 2 cm posteriorly in the right lobe. There are mildly enlarged lymph nodes in 
the lesser sac which may represent metastatic disease as well. The spleen, 
pancreas, kidneys, and adrenals appear unremarkable. 
  
CT PELVIS:  The appendix is not confidently identified, but there are no 
secondary signs of appendicitis. The prostate is not enlarged. No primary or 
secondary signs of appendicitis or identified. No pathologically enlarged lymph 
nodes or free fluid is evident. There is scattered aortoiliac atherosclerotic 
calcifications with mild ectasia but no focal aneurysm. Bone windows demonstrate no aggressive process, accounting for degenerative changes including 
severe osteoarthritis at the right hip. A 1 cm central lucency in the body of 
L4 has sclerotic margins and is not typical of metastatic disease. 
  
IMPRESSION:  
  
1. Extensive pulmonary parenchymal and hepatic metastatic disease as well as 
subcarinal and azygoesophageal recess lymphadenopathy likely representing 
metastatic disease as well. 
  
2.  Long-segment thickening of the wall of the distal esophagus suggests the 
possibility of a primary esophageal carcinoma. Follow-up gastroenterology 
consultation is recommended for consideration of endoscopic biopsy. 
  
3.  Left ventricular hypertrophy and scattered atherosclerosis.   
  
 
 
 
 
 
 
Past Medical History:  
Diagnosis Date  Acute respiratory failure with hypercapnia ([address] 2/1/2018  Chronic obstructive pulmonary disease ([address]  Heart failure ([address]  Sleep disorder Past Surgical History:  
Procedure Laterality Date  IR INSERT GASTROSTOMY TUBE [facility]  1/23/2020  IR INSERT TUNL CVC W PORT OVER 5 YEARS  1/21/2020 Current Facility-Administered Medications Medication Dose Route Frequency  ondansetron (ZOFRAN) injection 4 mg  4 mg IntraVENous PRN  
 HYDROmorphone (PF) (DILAUDID) injection 1 mg  1 mg IntraVENous Q4H PRN  
 cephALEXin (KEFLEX) capsule 500 mg  500 mg Oral Q6H  
 heparin (porcine) 100 unit/mL injection 300 Units  300 Units InterCATHeter PRN  
 oxyCODONE-acetaminophen (PERCOCET 7.5) 7.5-325 mg per tablet 1 Tab  1 Tab Oral Q6H PRN  
 ALPRAZolam (XANAX) tablet 0.25 mg  0.25 mg Oral TID PRN  
 lisinopril (PRINIVIL, ZESTRIL) tablet 10 mg  10 mg Oral DAILY  polyethylene glycol (MIRALAX) packet 17 g  17 g Oral BID  promethazine (PHENERGAN) with saline injection 12.5 mg  12.5 mg IntraVENous Q6H PRN  
 famotidine (PF) (PEPCID) 20 mg in 0.9% sodium chloride 10 mL injection  20 mg IntraVENous Q12H  albuterol-ipratropium (DUO-NEB) 2.5 MG-0.5 MG/3 ML  3 mL Nebulization Q4H PRN  
 tiotropium (SPIRIVA) inhalation capsule 18 mcg  1 Cap Inhalation DAILY  arformoteroL (BROVANA) neb solution 15 mcg  15 mcg Nebulization BID RT  
 amiodarone (CORDARONE) tablet 200 mg  200 mg Oral DAILY  sodium chloride (NS) flush 5-40 mL  5-40 mL IntraVENous Q8H  
 sodium chloride (NS) flush 5-40 mL  5-40 mL IntraVENous PRN  
 acetaminophen (TYLENOL) tablet 650 mg  650 mg Oral Q4H PRN  
 naloxone (NARCAN) injection 0.4 mg  0.4 mg IntraVENous PRN  
 diphenhydrAMINE (BENADRYL) capsule 25 mg  25 mg Oral Q4H PRN  
 ondansetron (ZOFRAN) injection 4 mg  4 mg IntraVENous Q4H PRN  
 magnesium hydroxide (MILK OF MAGNESIA) 400 mg/5 mL oral suspension 30 mL  30 mL Oral DAILY PRN  
 nicotine (NICODERM CQ) 21 mg/24 hr patch 1 Patch  1 Patch TransDERmal Q24H  
 influenza vaccine 2019-20 (6 mos+)(PF) (FLUARIX/FLULAVAL/FLUZONE QUAD) injection 0.5 mL  0.5 mL IntraMUSCular PRIOR TO DISCHARGE  hydrALAZINE (APRESOLINE) tablet 50 mg  50 mg Oral Q6H PRN  
 hydrALAZINE (APRESOLINE) 20 mg/mL injection 10 mg  10 mg IntraVENous Q6H PRN  
 dilTIAZem (CARDIZEM) IR tablet 90 mg  90 mg Oral TID Patient has no known allergies. Social History Socioeconomic History  Marital status:  Spouse name: Not on file  Number of children: Not on file  Years of education: Not on file  Highest education level: Not on file Tobacco Use  Smoking status: Current Every Day Smoker Packs/day: 2.00 Substance and Sexual Activity  Alcohol use: No  
  Comment: once a month beer  Drug use: Yes Types: Marijuana, Methamphetamines Social History Tobacco Use Smoking Status Current Every Day Smoker  Packs/day: 2.00 No family history on file. ROS: The patient has no difficulty with chest pain or shortness of breath. No fever or chills.   Comprehensive review of systems was otherwise unremarkable except as noted above. Physical Exam:  
Visit Vitals /70 (BP 1 Location: Right arm, BP Patient Position: At rest;Supine) Pulse 92 Temp 97.7 °F (36.5 °C) Resp 17 Ht 6' (1.829 m) Wt 183 lb 11.2 oz (83.3 kg) SpO2 94% BMI 24.91 kg/m² Vitals:  
 01/23/20 1918 01/23/20 1948 01/23/20 2255 01/24/20 0407 BP: 139/78  118/71 130/70 Pulse: 83  92 92 Resp: 16  16 17 Temp: 98.2 °F (36.8 °C)  98.8 °F (37.1 °C) 97.7 °F (36.5 °C) SpO2: 98% 97% 96% 94% Weight:      
Height:      
 
01/23 1901 - 01/24 0700 In: 250 [P.O.:250] Out: 550 [Urine:550] 01/22 0701 - 01/23 1900 In: 572 [P.O.:356; I.V.:300] Out: 850 [Urine:850] Constitutional: Alert, oriented, cooperative patient in no acute distress; appears weak Eyes:Sclera are clear. EOMs intact ENMT: no external lesions gross hearing normal; no obvious neck masses, no ear or lip lesions, nares normal 
CV: RRR. Normal perfusion; +Port Resp: No JVD. Breathing is  non-labored; no audible wheezing. GI: soft and non-distended; +gastrostomy Musculoskeletal: unremarkable with normal function. No embolic signs or cyanosis. Neuro:  Oriented; moves all 4; no focal deficits Psychiatric: normal affect and mood, no memory impairment Recent vitals (if inpt): 
Patient Vitals for the past 24 hrs: 
 BP Temp Pulse Resp SpO2  
01/24/20 0407 130/70 97.7 °F (36.5 °C) 92 17 94 % 01/23/20 2255 118/71 98.8 °F (37.1 °C) 92 16 96 % 01/23/20 1948     97 % 01/23/20 1918 139/78 98.2 °F (36.8 °C) 83 16 98 % 01/23/20 1521 122/70 97.7 °F (36.5 °C) 76 16 98 % 01/23/20 1215 121/73 97.6 °F (36.4 °C) 72 16 99 % 01/23/20 1141 122/70    95 % 01/23/20 1126 117/64    99 % 01/23/20 1115     100 % 01/23/20 1111 127/69    99 % 01/23/20 1110     100 % 01/23/20 1101     95 % 01/23/20 1059 118/70  65 14 99 % 01/23/20 1055 109/69  67 (!) 87 99 % 01/23/20 1049 114/69  62 11 (!) 89 % 01/23/20 1044 122/68  66 20 96 % 01/23/20 1040 104/73  68 24 94 % 01/23/20 1035 118/70  64 8 94 % 01/23/20 1030 116/65  62 18 97 % 01/23/20 1025 115/70  62 12 99 % 01/23/20 1022 112/72  60 9 98 % 01/23/20 1020    29 99 % 01/23/20 1015 112/69      
01/23/20 1005     91 % 01/23/20 1002     97 % 01/23/20 1000     93 % 01/23/20 0959 112/69 98.3 °F (36.8 °C) 71 16 90 % 01/23/20 0749 120/70 97.8 °F (36.6 °C) 74 18 100 % 01/23/20 0742     95 % Labs: 
Recent Labs  
  01/24/20 
0408 WBC 6.5 HGB 9.1*  
   
K 4.3  CO2 32 BUN 8  
CREA 0.65* GLU 93 TBILI 0.3 SGOT 13* ALT 6*  Lab Results Component Value Date/Time WBC 6.5 01/24/2020 04:08 AM  
 HGB 9.1 (L) 01/24/2020 04:08 AM  
 PLATELET 006 69/18/0677 04:08 AM  
 Sodium 137 01/24/2020 04:08 AM  
 Potassium 4.3 01/24/2020 04:08 AM  
 Chloride 102 01/24/2020 04:08 AM  
 CO2 32 01/24/2020 04:08 AM  
 BUN 8 01/24/2020 04:08 AM  
 Creatinine 0.65 (L) 01/24/2020 04:08 AM  
 Glucose 93 01/24/2020 04:08 AM  
 INR 1.0 01/17/2020 04:25 AM  
 aPTT 57.3 (H) 01/22/2018 08:26 AM  
 Bilirubin, total 0.3 01/24/2020 04:08 AM  
 AST (SGOT) 13 (L) 01/24/2020 04:08 AM  
 ALT (SGPT) 6 (L) 01/24/2020 04:08 AM  
 Alk. phosphatase 101 01/24/2020 04:08 AM  
 Lipase 270 01/16/2020 06:49 AM  
 Troponin-I, Qt. <0.02 (L) 01/16/2020 06:49 AM  
 
 
CT Results  (Last 48 hours) None  
  
 
chest X-ray I reviewed recent labs, recent radiologic studies, and pertinent records including other doctor notes if needed. I independently reviewed radiology images for studies I described above or studies I have ordered. Admission date (for inpatients): 1/16/2020 Day of Surgery  Procedure(s): ESOPHAGOGASTRODUODENOSCOPY (EGD) ASSESSMENT/PLAN: 
Problem List  Date Reviewed: 1/17/2020 Codes Class Noted  Cellulitis of left upper extremity ICD-10-CM: L03.114 
 ICD-9-CM: 682.3  1/21/2020 * (Principal) Esophageal mass ICD-10-CM: K22.8 ICD-9-CM: 530.89  1/17/2020 Liver metastases ([address] ICD-10-CM: C78.7 ICD-9-CM: 197.7  1/17/2020 Esophageal dysphagia ICD-10-CM: R13.10 ICD-9-CM: 787.20  1/17/2020 Weight loss ICD-10-CM: R63.4 ICD-9-CM: 783.21  1/17/2020 Chest pain ICD-10-CM: R07.9 ICD-9-CM: 786.50  1/16/2020 Methamphetamine abuse (HCC) (Chronic) ICD-10-CM: F15.10 ICD-9-CM: 305.70  12/2/2018 H/O atrial flutter (Chronic) ICD-10-CM: Z86.79 
ICD-9-CM: V12.59  12/2/2018 Overview Signed 2/6/2018  7:51 AM by Indio Denton NP  
  1/2018 Atrial flutter, s/p cardioversion. Essential hypertension (Chronic) ICD-10-CM: I10 
ICD-9-CM: 401.9  12/2/2018 Acute respiratory failure with hypoxia and hypercarbia (HCC) ICD-10-CM: J96.01, J96.02 
ICD-9-CM: 518.81  12/2/2018 Substance abuse (HCC) (Chronic) ICD-10-CM: F19.10 ICD-9-CM: 305.90  12/2/2018 Noncompliance (Chronic) ICD-10-CM: Z91.19 ICD-9-CM: V15.81  12/2/2018 Acute encephalopathy ICD-10-CM: G93.40 ICD-9-CM: 348.30  12/2/2018 COPD (chronic obstructive pulmonary disease) (HCC) (Chronic) ICD-10-CM: J44.9 ICD-9-CM: 280  11/16/2018 NATALIIA (obstructive sleep apnea) (Chronic) ICD-10-CM: D32.25 
ICD-9-CM: 327.23  11/16/2018 Chronic respiratory failure (HCC) (Chronic) ICD-10-CM: J96.10 ICD-9-CM: 518.83  11/16/2018 Altered mental status ICD-10-CM: R41.82 
ICD-9-CM: 780.97  11/16/2018 Morbid obesity ([facility] [facility] (Chronic) ICD-10-CM: E66.01 
ICD-9-CM: 278.01  11/16/2018 Nicotine dependence (Chronic) ICD-10-CM: M31.553 ICD-9-CM: 305.1  2/1/2018 Alcohol abuse (Chronic) ICD-10-CM: F10.10 ICD-9-CM: 305.00  2/1/2018 Homeless (Chronic) ICD-10-CM: Z59.0 ICD-9-CM: V60.0  1/15/2018 Urethral stricture (Chronic) ICD-10-CM: N35.919 ICD-9-CM: 598.9  1/7/2018 Anasarca ICD-10-CM: R60.1 ICD-9-CM: 782.3  1/6/2018 Acute kidney injury ([facility] [address] ICD-10-CM: N17.9 ICD-9-CM: 584.9  1/6/2018 Principal Problem: 
  Esophageal mass (1/17/2020) Active Problems: 
  Nicotine dependence (2/1/2018) Methamphetamine abuse ([facility] [address] (12/2/2018) Alcohol abuse (2/1/2018) Homeless (1/15/2018) Chest pain (1/16/2020) Liver metastases ([facility] [address] [facility] (1/17/2020) Esophageal dysphagia (1/17/2020) Weight loss (1/17/2020) Cellulitis of left upper extremity (1/21/2020) Stage 4 Widely-metastatic Esophageal adenocarcinoma with suspected diffuse lung, diffuse liver, and mediastinal mets Med onc planning chemotherapy s/p Port placement on 1/21/20 Palliative care following Homelessness He was homeless for about 6-8months but now lives with his son, but there is no heat in house (except portable propane tank space heater) Dysphagia/Weight loss PEG aborted on 1/22/20 IR placed gastrostomy on 1/23/20 No general surgical needs at present. Will sign off His PO intake should be restricted to full liquid diet only as a result of the large circumferential esophageal malignancy Eventually Home on full liquid diet only and tube feeds prn PEG feedings per nutrition recommendations I have personally performed a face-to-face diagnostic evaluation and management  service on this patient. I have independently seen the patient. I have independently obtained the above history from the patient/family. I have independently examined the patient with above findings. I have independently reviewed data/labs for this patient and developed the above plan of care (MDM). Signed: Juli Ko.  Mook Ocasio MD, FACS 
 
 
 Capillary refill less/equal to 2 seconds/Strong peripheral pulses

## 2021-05-18 NOTE — H&P ADULT - ENDOCRINE
Start metoprolol XL 25 mg daily.  Other medications stay the same  Outpatient nurse's blood pressure check in 3 weeks x2 please send me the results.  Low-salt diet  Stay active  Continued follow-up with Neurology  See me in 3 months physical with same day nonfasting HbA1c PSA.  
negative

## 2021-09-10 NOTE — ED ADULT NURSE NOTE - INTERPRETATION SERVICES DECLINED
Goal Outcome Evaluation:           Progress: no change  Outcome Summary: VSS, c/o pain x1 PRN given, awaiting MRI, + ambulation, thoracic surgery consulted, will continue to monitor   Patient/Caregiver requests family/friend to interpret.

## 2021-09-24 PROBLEM — I10 ESSENTIAL (PRIMARY) HYPERTENSION: Chronic | Status: ACTIVE | Noted: 2019-02-14

## 2021-10-08 ENCOUNTER — APPOINTMENT (OUTPATIENT)
Dept: INTERNAL MEDICINE | Facility: CLINIC | Age: 81
End: 2021-10-08
Payer: MEDICARE

## 2021-10-08 ENCOUNTER — NON-APPOINTMENT (OUTPATIENT)
Age: 81
End: 2021-10-08

## 2021-10-08 ENCOUNTER — TRANSCRIPTION ENCOUNTER (OUTPATIENT)
Age: 81
End: 2021-10-08

## 2021-10-08 VITALS
WEIGHT: 124 LBS | HEIGHT: 64 IN | TEMPERATURE: 98.3 F | BODY MASS INDEX: 21.17 KG/M2 | SYSTOLIC BLOOD PRESSURE: 129 MMHG | DIASTOLIC BLOOD PRESSURE: 69 MMHG | OXYGEN SATURATION: 96 % | RESPIRATION RATE: 12 BRPM | HEART RATE: 66 BPM

## 2021-10-08 DIAGNOSIS — R82.90 UNSPECIFIED ABNORMAL FINDINGS IN URINE: ICD-10-CM

## 2021-10-08 PROCEDURE — 99204 OFFICE O/P NEW MOD 45 MIN: CPT

## 2021-10-08 NOTE — PHYSICAL EXAM
[No Acute Distress] : no acute distress [Normal Sclera/Conjunctiva] : normal sclera/conjunctiva [No Lymphadenopathy] : no lymphadenopathy [Thyroid Normal, No Nodules] : the thyroid was normal and there were no nodules present [No Carotid Bruits] : no carotid bruits [No Abdominal Bruit] : a ~M bruit was not heard ~T in the abdomen [No Edema] : there was no peripheral edema [Normal] : soft, non-tender, non-distended, no masses palpated, no HSM and normal bowel sounds [No CVA Tenderness] : no CVA  tenderness

## 2021-10-08 NOTE — HISTORY OF PRESENT ILLNESS
[Other: _____] : [unfilled] [FreeTextEntry1] : C/O CLOUDY URINE  [de-identified] : PATIENT BROUGHT IN BY SON FOR RECENT H/O CLOUDY URINE . PATIENT HAS BEEN EVALUATED AT LOCAL \par UC FOR CLOUDY URINE AND PRESCRIBED 3 DIFFERENT ANTIBIOTICS, LINDA LAST BEING BACTRIM DS BID X 7 DAYS .\par SHE DENIES ANY FEVER , CHILLS , BACK PAIN , DYSURIA , HEMATURIA , URGENCY. \par SHE DOES NOT DRINK MUCH WATER DURING THE DAY.

## 2021-10-18 LAB
ALBUMIN SERPL ELPH-MCNC: 3.7 G/DL
ALP BLD-CCNC: 62 U/L
ALT SERPL-CCNC: 9 U/L
ANION GAP SERPL CALC-SCNC: 9 MMOL/L
APPEARANCE: ABNORMAL
AST SERPL-CCNC: 13 U/L
BACTERIA UR CULT: ABNORMAL
BACTERIA: NEGATIVE
BASOPHILS # BLD AUTO: 0.03 K/UL
BASOPHILS NFR BLD AUTO: 0.5 %
BILIRUB SERPL-MCNC: 0.5 MG/DL
BILIRUBIN URINE: NEGATIVE
BLOOD URINE: ABNORMAL
BUN SERPL-MCNC: 10 MG/DL
CALCIUM SERPL-MCNC: 9.2 MG/DL
CHLORIDE SERPL-SCNC: 106 MMOL/L
CHOLEST SERPL-MCNC: 184 MG/DL
CO2 SERPL-SCNC: 28 MMOL/L
COLOR: YELLOW
CREAT SERPL-MCNC: 0.54 MG/DL
EOSINOPHIL # BLD AUTO: 0.05 K/UL
EOSINOPHIL NFR BLD AUTO: 0.8 %
GLUCOSE QUALITATIVE U: NEGATIVE
GLUCOSE SERPL-MCNC: 91 MG/DL
HCT VFR BLD CALC: 38.3 %
HDLC SERPL-MCNC: 51 MG/DL
HGB BLD-MCNC: 12.8 G/DL
HYALINE CASTS: 3 /LPF
IMM GRANULOCYTES NFR BLD AUTO: 0.2 %
KETONES URINE: NEGATIVE
LDLC SERPL CALC-MCNC: 117 MG/DL
LEUKOCYTE ESTERASE URINE: ABNORMAL
LYMPHOCYTES # BLD AUTO: 2.39 K/UL
LYMPHOCYTES NFR BLD AUTO: 40.3 %
MAN DIFF?: NORMAL
MCHC RBC-ENTMCNC: 29.1 PG
MCHC RBC-ENTMCNC: 33.4 GM/DL
MCV RBC AUTO: 87 FL
MICROSCOPIC-UA: NORMAL
MONOCYTES # BLD AUTO: 0.43 K/UL
MONOCYTES NFR BLD AUTO: 7.3 %
NEUTROPHILS # BLD AUTO: 3.02 K/UL
NEUTROPHILS NFR BLD AUTO: 50.9 %
NITRITE URINE: NEGATIVE
NONHDLC SERPL-MCNC: 133 MG/DL
PH URINE: 7
PLATELET # BLD AUTO: 244 K/UL
POTASSIUM SERPL-SCNC: 4.1 MMOL/L
PROT SERPL-MCNC: 7.2 G/DL
PROTEIN URINE: ABNORMAL
RBC # BLD: 4.4 M/UL
RBC # FLD: 14.1 %
RED BLOOD CELLS URINE: 630 /HPF
SODIUM SERPL-SCNC: 143 MMOL/L
SPECIFIC GRAVITY URINE: 1.01
SQUAMOUS EPITHELIAL CELLS: 1 /HPF
TRIGL SERPL-MCNC: 79 MG/DL
TSH SERPL-ACNC: 4.59 UIU/ML
UROBILINOGEN URINE: NORMAL
WBC # FLD AUTO: 5.93 K/UL
WHITE BLOOD CELLS URINE: 9 /HPF

## 2021-10-18 RX ORDER — AMLODIPINE BESYLATE 5 MG/1
5 TABLET ORAL
Qty: 90 | Refills: 0 | Status: ACTIVE | COMMUNITY
Start: 2021-10-18

## 2021-10-18 RX ORDER — ATENOLOL 25 MG/1
25 TABLET ORAL TWICE DAILY
Qty: 180 | Refills: 0 | Status: ACTIVE | COMMUNITY
Start: 2021-10-18

## 2021-10-21 ENCOUNTER — TRANSCRIPTION ENCOUNTER (OUTPATIENT)
Age: 81
End: 2021-10-21

## 2021-10-22 ENCOUNTER — TRANSCRIPTION ENCOUNTER (OUTPATIENT)
Age: 81
End: 2021-10-22

## 2021-10-26 ENCOUNTER — TRANSCRIPTION ENCOUNTER (OUTPATIENT)
Age: 81
End: 2021-10-26

## 2021-10-26 RX ORDER — APIXABAN 5 MG/1
5 TABLET, FILM COATED ORAL
Qty: 60 | Refills: 2 | Status: ACTIVE | COMMUNITY
Start: 2021-10-26

## 2021-11-28 ENCOUNTER — OUTPATIENT (OUTPATIENT)
Dept: OUTPATIENT SERVICES | Facility: HOSPITAL | Age: 81
LOS: 1 days | End: 2021-11-28
Payer: MEDICARE

## 2021-11-28 ENCOUNTER — APPOINTMENT (OUTPATIENT)
Dept: CT IMAGING | Facility: IMAGING CENTER | Age: 81
End: 2021-11-28
Payer: MEDICARE

## 2021-11-28 DIAGNOSIS — Z00.8 ENCOUNTER FOR OTHER GENERAL EXAMINATION: ICD-10-CM

## 2021-11-28 PROCEDURE — 74178 CT ABD&PLV WO CNTR FLWD CNTR: CPT | Mod: 26,MH

## 2021-11-28 PROCEDURE — 74178 CT ABD&PLV WO CNTR FLWD CNTR: CPT | Mod: MH

## 2022-04-12 NOTE — ED ADULT TRIAGE NOTE - ADDITIONAL SAFETY/BANDS...
Zyclara Counseling:  I discussed with the patient the risks of imiquimod including but not limited to erythema, scaling, itching, weeping, crusting, and pain.  Patient understands that the inflammatory response to imiquimod is variable from person to person and was educated regarded proper titration schedule.  If flu-like symptoms develop, patient knows to discontinue the medication and contact us. Additional Safety/Bands:

## 2022-06-13 ENCOUNTER — OUTPATIENT (OUTPATIENT)
Dept: OUTPATIENT SERVICES | Facility: HOSPITAL | Age: 82
LOS: 1 days | End: 2022-06-13
Payer: MEDICARE

## 2022-06-13 ENCOUNTER — APPOINTMENT (OUTPATIENT)
Dept: CT IMAGING | Facility: IMAGING CENTER | Age: 82
End: 2022-06-13
Payer: MEDICARE

## 2022-06-13 DIAGNOSIS — Z00.8 ENCOUNTER FOR OTHER GENERAL EXAMINATION: ICD-10-CM

## 2022-06-13 PROCEDURE — 74178 CT ABD&PLV WO CNTR FLWD CNTR: CPT | Mod: 26,MH

## 2022-06-13 PROCEDURE — 74178 CT ABD&PLV WO CNTR FLWD CNTR: CPT | Mod: MH

## 2022-06-14 ENCOUNTER — APPOINTMENT (OUTPATIENT)
Dept: CT IMAGING | Facility: CLINIC | Age: 82
End: 2022-06-14

## 2022-10-14 NOTE — INPATIENT CERTIFICATION FOR MEDICARE PATIENTS - CURRENT MEDICAL NEEDS AND CARE PLANS
Problem: Respiratory Impairment - Respiratory Therapy 253  Intervention: Airway clearance techniques  Note: Intervention Status  Done  Intervention: Inhaled medication delivery  Note: Intervention Status  Done  Intervention: Respiratory support devices  Note: Intervention Status  Done      Possible Home

## 2023-06-15 NOTE — DISCHARGE NOTE ADULT - NS MD DC FALL RISK RISK
For information on Fall & Injury Prevention, visit [email or web address]

## 2023-10-13 ENCOUNTER — APPOINTMENT (OUTPATIENT)
Dept: INTERNAL MEDICINE | Facility: CLINIC | Age: 83
End: 2023-10-13
Payer: MEDICARE

## 2023-10-13 VITALS
HEIGHT: 64 IN | DIASTOLIC BLOOD PRESSURE: 62 MMHG | BODY MASS INDEX: 20.32 KG/M2 | RESPIRATION RATE: 12 BRPM | OXYGEN SATURATION: 94 % | HEART RATE: 80 BPM | SYSTOLIC BLOOD PRESSURE: 100 MMHG | WEIGHT: 119 LBS

## 2023-10-13 DIAGNOSIS — Z23 ENCOUNTER FOR IMMUNIZATION: ICD-10-CM

## 2023-10-13 DIAGNOSIS — R68.89 OTHER GENERAL SYMPTOMS AND SIGNS: ICD-10-CM

## 2023-10-13 DIAGNOSIS — I48.0 PAROXYSMAL ATRIAL FIBRILLATION: ICD-10-CM

## 2023-10-13 DIAGNOSIS — I10 ESSENTIAL (PRIMARY) HYPERTENSION: ICD-10-CM

## 2023-10-13 DIAGNOSIS — Z00.00 ENCOUNTER FOR GENERAL ADULT MEDICAL EXAMINATION W/OUT ABNORMAL FINDINGS: ICD-10-CM

## 2023-10-13 PROCEDURE — 90662 IIV NO PRSV INCREASED AG IM: CPT

## 2023-10-13 PROCEDURE — 36415 COLL VENOUS BLD VENIPUNCTURE: CPT

## 2023-10-13 PROCEDURE — 99214 OFFICE O/P EST MOD 30 MIN: CPT | Mod: 25

## 2023-10-13 PROCEDURE — G0008: CPT

## 2023-11-03 LAB
ALBUMIN SERPL ELPH-MCNC: 3.9 G/DL
ALP BLD-CCNC: 63 U/L
ALT SERPL-CCNC: 6 U/L
ANION GAP SERPL CALC-SCNC: 10 MMOL/L
APPEARANCE: CLEAR
AST SERPL-CCNC: 15 U/L
BACTERIA: NEGATIVE /HPF
BASOPHILS # BLD AUTO: 0.03 K/UL
BASOPHILS NFR BLD AUTO: 0.5 %
BILIRUB SERPL-MCNC: 0.5 MG/DL
BILIRUBIN URINE: NEGATIVE
BLOOD URINE: ABNORMAL
BUN SERPL-MCNC: 7 MG/DL
CALCIUM SERPL-MCNC: 9.6 MG/DL
CAST: 0 /LPF
CHLORIDE SERPL-SCNC: 106 MMOL/L
CHOLEST SERPL-MCNC: 185 MG/DL
CO2 SERPL-SCNC: 27 MMOL/L
COLOR: NORMAL
CREAT SERPL-MCNC: 0.54 MG/DL
EGFR: 91 ML/MIN/1.73M2
EOSINOPHIL # BLD AUTO: 0.05 K/UL
EOSINOPHIL NFR BLD AUTO: 0.9 %
EPITHELIAL CELLS: 1 /HPF
ESTIMATED AVERAGE GLUCOSE: 105 MG/DL
FERRITIN SERPL-MCNC: 66 NG/ML
FOLATE SERPL-MCNC: >20 NG/ML
GLUCOSE QUALITATIVE U: NEGATIVE MG/DL
GLUCOSE SERPL-MCNC: 96 MG/DL
HBA1C MFR BLD HPLC: 5.3 %
HCT VFR BLD CALC: 41.7 %
HDLC SERPL-MCNC: 53 MG/DL
HGB BLD-MCNC: 13.7 G/DL
IMM GRANULOCYTES NFR BLD AUTO: 0.2 %
IRON SATN MFR SERPL: 33 %
IRON SERPL-MCNC: 78 UG/DL
KETONES URINE: NEGATIVE MG/DL
LDLC SERPL CALC-MCNC: 119 MG/DL
LEUKOCYTE ESTERASE URINE: ABNORMAL
LYMPHOCYTES # BLD AUTO: 1.47 K/UL
LYMPHOCYTES NFR BLD AUTO: 26.5 %
MAN DIFF?: NORMAL
MCHC RBC-ENTMCNC: 30.1 PG
MCHC RBC-ENTMCNC: 32.9 GM/DL
MCV RBC AUTO: 91.6 FL
MICROSCOPIC-UA: NORMAL
MONOCYTES # BLD AUTO: 0.41 K/UL
MONOCYTES NFR BLD AUTO: 7.4 %
NEUTROPHILS # BLD AUTO: 3.57 K/UL
NEUTROPHILS NFR BLD AUTO: 64.5 %
NITRITE URINE: NEGATIVE
NONHDLC SERPL-MCNC: 132 MG/DL
PH URINE: 6.5
PLATELET # BLD AUTO: 217 K/UL
POTASSIUM SERPL-SCNC: 4.6 MMOL/L
PROT SERPL-MCNC: 7.5 G/DL
PROTEIN URINE: 30 MG/DL
RBC # BLD: 4.55 M/UL
RBC # FLD: 15.1 %
RED BLOOD CELLS URINE: 102 /HPF
SODIUM SERPL-SCNC: 143 MMOL/L
SPECIFIC GRAVITY URINE: 1.01
T3 SERPL-MCNC: 117 NG/DL
T4 FREE SERPL-MCNC: 1.2 NG/DL
TIBC SERPL-MCNC: 235 UG/DL
TRIGL SERPL-MCNC: 71 MG/DL
TSH SERPL-ACNC: 5.35 UIU/ML
UIBC SERPL-MCNC: 157 UG/DL
UROBILINOGEN URINE: 0.2 MG/DL
VIT B12 SERPL-MCNC: 969 PG/ML
WBC # FLD AUTO: 5.54 K/UL
WHITE BLOOD CELLS URINE: 4 /HPF

## 2023-12-01 NOTE — PATIENT PROFILE ADULT - EQUIPMENT CURRENTLY USED AT HOME
Medication: hydroCHLOROthiazide (HYDRODIURIL) 12.5 MG tablet [Fito Meehan]         atorvastatin (LIPITOR) 40 MG tablet [Fito Meehan]         amLODIPine (NORVASC) 10 MG tablet [Fito Meehan]         metoPROLOL succinate (TOPROL-XL) 25 MG 24 hr tablet [Fito Meehan]         losartan (COZAAR) 25 MG tablet [Fito Meehan]     Last office visit date: 10/4/22  Medication Refill Protocol Failed.  Medication Refill Protocol Failed. Courtesy Refill provided after Appointment scheduled per protocol guidelines. Writer confirmed, courtesy refill was not a duplicate.    
no

## 2023-12-11 ENCOUNTER — LABORATORY RESULT (OUTPATIENT)
Age: 83
End: 2023-12-11

## 2024-04-12 NOTE — PROGRESS NOTE ADULT - PROBLEM SELECTOR PLAN 3
Assessment and Plan:    1. Benign essential HTN  Controlled, continue current medication.  - amLODIPine (NORVASC) 5 MG tablet; Take 1 tablet (5 mg total) by mouth once daily.  Dispense: 90 tablet; Refill: 3    2. Dyslipidemia  Calculated ASCVD risk 5.3%.  We discussed diet changes and rechecking lipids in six months.  Patient is not interested in medications at this time.  - Lipid Panel; Future    3. DORON (generalized anxiety disorder)  Continue follow-up with Psychiatry.      ______________________________________________________________________  Subjective:    Chief Complaint:  Follow up chronic medical conditions.    HPI:  Erin is a 63 y.o. year old female here to follow up chronic medical conditions.     She takes amlodipine 5 mg daily for HTN. BP has been controlled with this. She has not had leg swelling or any other side effects.     Recent labs notable for worsening dyslipidemia.  She reports that her diet has not been great lately.  Has been eating more junk food.  Has gained some weight.     She is on HRT per Dr. Soria.      She is seeing psychiatry for ADHD and anxiety.     Medications:  Current Outpatient Medications on File Prior to Visit   Medication Sig Dispense Refill    ALPRAZolam (XANAX) 0.5 MG tablet Take 1 tablet (0.5 mg total) by mouth as needed for Anxiety. 15 tablet 0    amLODIPine (NORVASC) 5 MG tablet Take 1 tablet (5 mg total) by mouth once daily. 90 tablet 0    EScitalopram oxalate (LEXAPRO) 10 MG tablet Take 1 tablet (10 mg total) by mouth once daily. 90 tablet 1    linaCLOtide (LINZESS) 290 mcg Cap capsule 290 mcg.  1    methylphenidate HCl 36 MG CR tablet Take 1 tablet (36 mg total) by mouth every morning. 30 tablet 0    zolpidem (AMBIEN) 5 MG Tab Take 1/2 tablet (2.5 mg) qhs prn 30 tablet 2     Current Facility-Administered Medications on File Prior to Visit   Medication Dose Route Frequency Provider Last Rate Last Admin    estradiol cypionate 5 mg/mL injection 5 mg  5 mg 
"Intramuscular Q28 Days Zoya Soria MD   5 mg at 04/01/24 0908    estradiol valerate (DELESTROGEN) injection 10 mg/mL  10 mg Intramuscular Q30 Days Zoya Soria MD   10 mg at 12/11/23 1012    testosterone cypionate injection 50 mg  50 mg Intramuscular Q28 Days Zoya Soria MD   50 mg at 04/01/24 0908       Review of Systems:  Review of Systems   Respiratory:  Negative for shortness of breath and wheezing.    Cardiovascular:  Negative for chest pain and leg swelling.   Gastrointestinal:  Negative for diarrhea, nausea and vomiting.   Neurological:  Negative for dizziness, syncope and light-headedness.       Past Medical History:  Past Medical History:   Diagnosis Date    ADD (attention deficit disorder)     Anxiety     Hypertension 06/2020    Insomnia     Menopausal symptoms     Personal history of colonic polyps 03/15/2023    Colonoscopy Claudio Joyner MD       Objective:    Vitals:  Vitals:    04/12/24 0906   BP: 118/74   Pulse: 68   Resp: 16   Weight: 61 kg (134 lb 5.9 oz)   Height: 5' 2" (1.575 m)       Physical Exam  Vitals reviewed.   Constitutional:       General: She is not in acute distress.     Appearance: She is well-developed. She is not diaphoretic.   HENT:      Mouth/Throat:      Pharynx: No oropharyngeal exudate.   Eyes:      General: No scleral icterus.        Right eye: No discharge.         Left eye: No discharge.      Conjunctiva/sclera: Conjunctivae normal.   Neck:      Thyroid: No thyromegaly.      Trachea: No tracheal deviation.   Cardiovascular:      Rate and Rhythm: Normal rate and regular rhythm.      Heart sounds: Normal heart sounds. No murmur heard.  Pulmonary:      Effort: Pulmonary effort is normal. No respiratory distress.      Breath sounds: Normal breath sounds. No wheezing, rhonchi or rales.   Abdominal:      General: Bowel sounds are normal. There is no distension.      Palpations: Abdomen is soft.      Tenderness: There is no abdominal tenderness. There is "
no guarding or rebound.   Musculoskeletal:      Cervical back: Neck supple.      Right lower leg: No edema.      Left lower leg: No edema.   Lymphadenopathy:      Cervical: No cervical adenopathy.   Skin:     General: Skin is warm and dry.   Neurological:      Mental Status: She is alert and oriented to person, place, and time.   Psychiatric:         Behavior: Behavior normal.         Judgment: Judgment normal.         Data:  CMP unremarkable  LDL up to 137    Elvira Buckner MD  Internal Medicine    
GI/DVT prophylaxis
Now with recurrent atrial fibrillation -- rate controlled  Started on full dose lovenox; switch to eliquis upon discharge  Continue atenolol for rate control  Monitor on remote tele  Check ECHO (or get old ECHO results)
Continue atenolol and norvasc with hold parameters
Continue atenolol and norvasc with hold parameters
Likely secondary to pyelonephritis  All cultures, including RVP, negative  Fever curve trending downward  Continue to monitor
Pan-cultured -- NTD  Needs  f/u as outpatient in 2 weeks
Pan-cultured -- NTD  Needs  f/u as outpatient in 2 weeks

## 2024-12-13 NOTE — PROGRESS NOTE ADULT - SUBJECTIVE AND OBJECTIVE BOX
Patient is a 78y old  Female who presents with a chief complaint of fever, abdominal pain (19 Feb 2019 08:00)      INTERVAL HPI/OVERNIGHT EVENTS: Patient seen and examined. NAD. No complaints.    Vital Signs Last 24 Hrs  T(C): 36.6 (19 Feb 2019 08:38), Max: 37.4 (18 Feb 2019 16:31)  T(F): 97.9 (19 Feb 2019 08:38), Max: 99.4 (18 Feb 2019 16:31)  HR: 81 (19 Feb 2019 08:38) (81 - 114)  BP: 103/70 (19 Feb 2019 08:38) (103/70 - 126/86)  BP(mean): --  RR: 16 (19 Feb 2019 08:38) (16 - 18)  SpO2: 97% (19 Feb 2019 08:38) (95% - 98%)    02-19    145  |  106  |  10  ----------------------------<  117<H>  3.9   |  32<H>  |  0.53    Ca    8.5      19 Feb 2019 06:45  Mg     2.2     02-19                            11.1   5.34  )-----------( 311      ( 19 Feb 2019 06:45 )             33.2     PT/INR - ( 19 Feb 2019 06:45 )   PT: 12.5 sec;   INR: 1.09 ratio           CAPILLARY BLOOD GLUCOSE                  acetaminophen   Tablet .. 650 milliGRAM(s) Oral every 6 hours PRN  ALBUTerol    0.083% 2.5 milliGRAM(s) Nebulizer every 12 hours  amLODIPine   Tablet 5 milliGRAM(s) Oral daily  aspirin enteric coated 81 milliGRAM(s) Oral daily  ATENolol  Tablet 25 milliGRAM(s) Oral every 12 hours  enoxaparin Injectable 70 milliGRAM(s) SubCutaneous every 12 hours  meropenem  IVPB 1000 milliGRAM(s) IV Intermittent every 8 hours  vancomycin  IVPB 1000 milliGRAM(s) IV Intermittent every 12 hours              REVIEW OF SYSTEMS:  CONSTITUTIONAL: No fever, no weight loss, or no fatigue  NECK: No pain, no stiffness  RESPIRATORY: No cough, no wheezing, no chills, no hemoptysis, No shortness of breath  CARDIOVASCULAR: No chest pain, no palpitations, no dizziness, no leg swelling  GASTROINTESTINAL: No abdominal pain. No nausea, no vomiting, no hematemesis; No diarrhea, no constipation. No melena, no hematochezia.  GENITOURINARY: No dysuria, no frequency, no hematuria, no incontinence  NEUROLOGICAL: No headaches, no loss of strength, no numbness, no tremors  SKIN: No itching, no burning  MUSCULOSKELETAL: No joint pain, no swelling; No muscle, no back, no extremity pain  PSYCHIATRIC: No depression, no mood swings,   HEME/LYMPH: No easy bruising, no bleeding gums  ALLERY AND IMMUNOLOGIC: No hives       Consultant(s) Notes Reviewed:  [X] YES  [ ] NO    PHYSICAL EXAM:  GENERAL: NAD  HEAD:  Atraumatic, Normocephalic  EYES: EOMI, PERRLA, conjunctiva and sclera clear  ENMT: No tonsillar erythema, exudates, or enlargement; Moist mucous membranes  NECK: Supple, No JVD  NERVOUS SYSTEM:  Awake & alert  CHEST/LUNG: Clear to auscultation bilaterally; No rales, rhonchi, wheezing,  HEART: Regular rate and rhythm  ABDOMEN: Soft, Nontender, Nondistended; Bowel sounds present  EXTREMITIES:  No clubbing, cyanosis, or edema  LYMPH: No lymphadenopathy noted  SKIN: No rashes      Advanced care planning discussed with patient/family [X] YES   [ ] NO    Advanced care planning discussed with patient/family. Advanced care planning forms reviewed/discussed/completed. 20 minutes spent. No assistance needed